# Patient Record
Sex: FEMALE | Race: WHITE | NOT HISPANIC OR LATINO | Employment: STUDENT | URBAN - METROPOLITAN AREA
[De-identification: names, ages, dates, MRNs, and addresses within clinical notes are randomized per-mention and may not be internally consistent; named-entity substitution may affect disease eponyms.]

---

## 2023-01-04 ENCOUNTER — TELEPHONE (OUTPATIENT)
Dept: NEPHROLOGY | Facility: CLINIC | Age: 23
End: 2023-01-04
Payer: COMMERCIAL

## 2023-01-04 NOTE — TELEPHONE ENCOUNTER
----- Message from Asha William sent at 1/4/2023  3:16 PM CST -----  Contact: Self  Type:  Test Results    Who Called: Pt  Name of Test (Lab/Mammo/Etc): Labs    Would the patient rather a call back or a response via [facility]? Call  Best Call Back Number: [phone]    Additional Information:  NEW pt w/Rt Kidney CKD Secondary to UPJ Obstruct, Ref by Dr. Estefany Negro, [facility], Ped Nephrology      Pt has an appt 1/5/2023, 8:30 am. but is willing to do labs after appt.

## 2023-01-05 ENCOUNTER — TELEPHONE (OUTPATIENT)
Dept: NEPHROLOGY | Facility: CLINIC | Age: 23
End: 2023-01-05
Payer: COMMERCIAL

## 2023-01-05 ENCOUNTER — PATIENT MESSAGE (OUTPATIENT)
Dept: NEPHROLOGY | Facility: CLINIC | Age: 23
End: 2023-01-05

## 2023-01-05 ENCOUNTER — TELEPHONE (OUTPATIENT)
Dept: OBSTETRICS AND GYNECOLOGY | Facility: CLINIC | Age: 23
End: 2023-01-05
Payer: COMMERCIAL

## 2023-01-05 ENCOUNTER — OFFICE VISIT (OUTPATIENT)
Dept: NEPHROLOGY | Facility: CLINIC | Age: 23
End: 2023-01-05
Payer: COMMERCIAL

## 2023-01-05 ENCOUNTER — HOSPITAL ENCOUNTER (OUTPATIENT)
Dept: RADIOLOGY | Facility: HOSPITAL | Age: 23
Discharge: HOME | End: 2023-01-05
Attending: INTERNAL MEDICINE
Payer: COMMERCIAL

## 2023-01-05 VITALS
HEART RATE: 97 BPM | WEIGHT: 109.88 LBS | OXYGEN SATURATION: 99 % | SYSTOLIC BLOOD PRESSURE: 112 MMHG | HEIGHT: 65 IN | BODY MASS INDEX: 18.31 KG/M2 | DIASTOLIC BLOOD PRESSURE: 70 MMHG

## 2023-01-05 DIAGNOSIS — Q62.39 UPJ OBSTRUCTION, CONGENITAL: ICD-10-CM

## 2023-01-05 DIAGNOSIS — I15.1 HYPERTENSION SECONDARY TO OTHER RENAL DISORDERS: ICD-10-CM

## 2023-01-05 DIAGNOSIS — R10.9 RIGHT FLANK PAIN: ICD-10-CM

## 2023-01-05 DIAGNOSIS — I77.810 DILATED AORTIC ROOT: ICD-10-CM

## 2023-01-05 DIAGNOSIS — R10.9 RIGHT FLANK PAIN: Primary | ICD-10-CM

## 2023-01-05 DIAGNOSIS — R73.9 HYPERGLYCEMIA: ICD-10-CM

## 2023-01-05 DIAGNOSIS — L70.0 ACNE VULGARIS: ICD-10-CM

## 2023-01-05 DIAGNOSIS — E55.9 VITAMIN D DEFICIENCY: ICD-10-CM

## 2023-01-05 PROCEDURE — 99999 PR PBB SHADOW E&M-EST. PATIENT-LVL III: ICD-10-PCS | Mod: PBBFAC,,, | Performed by: INTERNAL MEDICINE

## 2023-01-05 PROCEDURE — 99999 PR PBB SHADOW E&M-EST. PATIENT-LVL III: CPT | Mod: PBBFAC,,, | Performed by: INTERNAL MEDICINE

## 2023-01-05 PROCEDURE — 76770 US EXAM ABDO BACK WALL COMP: CPT | Mod: TC,PO

## 2023-01-05 PROCEDURE — 76770 US RETROPERITONEAL COMPLETE: ICD-10-PCS | Mod: 26,,, | Performed by: RADIOLOGY

## 2023-01-05 PROCEDURE — 99204 OFFICE O/P NEW MOD 45 MIN: CPT | Mod: S$GLB,,, | Performed by: INTERNAL MEDICINE

## 2023-01-05 PROCEDURE — 76770 US EXAM ABDO BACK WALL COMP: CPT | Mod: 26,,, | Performed by: RADIOLOGY

## 2023-01-05 PROCEDURE — 99204 PR OFFICE/OUTPT VISIT, NEW, LEVL IV, 45-59 MIN: ICD-10-PCS | Mod: S$GLB,,, | Performed by: INTERNAL MEDICINE

## 2023-01-05 RX ORDER — LOSARTAN POTASSIUM 25 MG/1
1 TABLET ORAL NIGHTLY
COMMUNITY
Start: 2021-08-01 | End: 2023-11-29 | Stop reason: SDUPTHER

## 2023-01-05 RX ORDER — TRETINOIN 0.5 MG/G
1 LOTION TOPICAL
COMMUNITY
Start: 2022-01-04 | End: 2024-04-01

## 2023-01-05 NOTE — TELEPHONE ENCOUNTER
----- Message from Jeana Chan, Patient Care Assistant sent at 1/5/2023 12:51 PM CST -----  Regarding: appointment  Contact: Pt  Type:  Sooner Appointment Request    Caller is requesting a sooner appointment.  Caller declined first available appointment listed below.  Caller will not accept being placed on the waitlist and is requesting a message be sent to doctor.    Name of Caller:  pt   When is the first available appointment?  2023  Symptoms:  new pt problem/concern - orders for US  Best Call Back Number:  [phone] (home)     Additional Information:  please call pt pt

## 2023-01-05 NOTE — PROGRESS NOTES
Subjective:       Patient ID: Marianne Gasca is a 22 y.o. White female who presents for new evaluation of Consult    HPI    She is referred by her Care Team at [facility] for history of small unilateral kidney due to UPJ obstruction s/p repair 2016. Last seen by Pediatric Nephrology 9/2022  She presents sooner than her one year follow up due to R flank pain, same pain she had 3 years prior, constant ache, dull not sharp, worst yesterday, non radiating, no LUTS. Uses prescription Toradol after Tylenol   BP has been intermittently elevated, was controlled on low sodium diet and basically DASH diet but required lisinopril, transitioned to ARB,  on low dose losartan now   Healthy diet where BP was controlled prior, 1557-6286  Since childhood good weight   Exercise regimen--light strengthening, pilates, walk and ride bikes   No DM in family   Mom HTN  High Lipids   No LE edema   No Caffeine, in general  Liquid sugar with R kidney 'sensation' thus avoids sugar   Heat intolereance   No chronic GI issues  In grad school, Masters    Review of Systems   Constitutional:  Negative for activity change, appetite change and fatigue.   HENT:  Negative for facial swelling.    Respiratory:  Negative for shortness of breath.    Cardiovascular:  Negative for leg swelling.   Gastrointestinal:  Negative for constipation, diarrhea, nausea and vomiting.   Genitourinary:  Positive for flank pain. Negative for decreased urine volume, difficulty urinating, hematuria and urgency.   Musculoskeletal:  Positive for back pain.   Skin:  Negative for rash.   Neurological:  Negative for weakness.   Psychiatric/Behavioral:  Negative for confusion and decreased concentration.      Objective:      Physical Exam  Vitals and nursing note reviewed.   Constitutional:       General: She is not in acute distress.     Appearance: Normal appearance. She is normal weight. She is not ill-appearing.   HENT:      Head: Atraumatic.   Eyes:      General: No  scleral icterus.  Cardiovascular:      Rate and Rhythm: Normal rate.   Pulmonary:      Effort: Pulmonary effort is normal. No respiratory distress.      Breath sounds: No wheezing or rales.   Abdominal:      General: There is no distension.   Musculoskeletal:         General: No swelling.      Right lower leg: No edema.      Left lower leg: No edema.   Skin:     General: Skin is warm and dry.   Neurological:      Mental Status: She is alert and oriented to person, place, and time.   Psychiatric:         Mood and Affect: Mood normal.       Assessment:       1. Right flank pain    2. UPJ obstruction, congenital    3. Vitamin D deficiency    4. Hyperglycemia    5. Hypertension secondary to other renal disorders    6. Dilated ascending aorta    7. Acne vulgaris        Plan:          R flank pain in setting of congenital UPJ obstruction   - check urinalysis, chemistries, and renal u/s  - would like TRS and will schedule ASAP   - will need to establish with Urology     CKD Stage 1 with unilateral small R kidney   - annual chemistries and urine protein evaluation to be reviewed by Nephrologist   - baseline renal u/s    HTN  - monitor on current dose losartan     Mineral and Bone Disease  - check D level     Pain control  - stop Toradol, Tylenol followed by ASA , max dose 625mg for several days     Kidney Health  - screen for pre DM and DM  - already on low sodium diet and healthy diet   - avoid NSAIDs      Needs Adult PCP

## 2023-01-09 ENCOUNTER — HOSPITAL ENCOUNTER (OUTPATIENT)
Dept: RADIOLOGY | Facility: HOSPITAL | Age: 23
Discharge: HOME | End: 2023-01-09
Attending: OBSTETRICS & GYNECOLOGY
Payer: COMMERCIAL

## 2023-01-09 ENCOUNTER — PATIENT MESSAGE (OUTPATIENT)
Dept: NEPHROLOGY | Facility: CLINIC | Age: 23
End: 2023-01-09
Payer: COMMERCIAL

## 2023-01-09 ENCOUNTER — OFFICE VISIT (OUTPATIENT)
Dept: OBSTETRICS AND GYNECOLOGY | Facility: CLINIC | Age: 23
End: 2023-01-09
Payer: COMMERCIAL

## 2023-01-09 VITALS
DIASTOLIC BLOOD PRESSURE: 62 MMHG | WEIGHT: 109.13 LBS | SYSTOLIC BLOOD PRESSURE: 104 MMHG | HEIGHT: 65 IN | BODY MASS INDEX: 18.18 KG/M2

## 2023-01-09 DIAGNOSIS — I15.1 HYPERTENSION SECONDARY TO OTHER RENAL DISORDERS: ICD-10-CM

## 2023-01-09 DIAGNOSIS — N83.202 LEFT OVARIAN CYST: Primary | ICD-10-CM

## 2023-01-09 DIAGNOSIS — N13.5 UPJ OBSTRUCTION, ACQUIRED: ICD-10-CM

## 2023-01-09 DIAGNOSIS — N83.202 LEFT OVARIAN CYST: ICD-10-CM

## 2023-01-09 DIAGNOSIS — N18.9 CHRONIC KIDNEY DISEASE, UNSPECIFIED CKD STAGE: ICD-10-CM

## 2023-01-09 DIAGNOSIS — R10.9 RIGHT FLANK PAIN: Primary | ICD-10-CM

## 2023-01-09 PROCEDURE — 3074F SYST BP LT 130 MM HG: CPT | Mod: CPTII,S$GLB,, | Performed by: OBSTETRICS & GYNECOLOGY

## 2023-01-09 PROCEDURE — 4010F ACE/ARB THERAPY RXD/TAKEN: CPT | Mod: CPTII,S$GLB,, | Performed by: OBSTETRICS & GYNECOLOGY

## 2023-01-09 PROCEDURE — 3078F PR MOST RECENT DIASTOLIC BLOOD PRESSURE < 80 MM HG: ICD-10-PCS | Mod: CPTII,S$GLB,, | Performed by: OBSTETRICS & GYNECOLOGY

## 2023-01-09 PROCEDURE — 1159F PR MEDICATION LIST DOCUMENTED IN MEDICAL RECORD: ICD-10-PCS | Mod: CPTII,S$GLB,, | Performed by: OBSTETRICS & GYNECOLOGY

## 2023-01-09 PROCEDURE — 3008F BODY MASS INDEX DOCD: CPT | Mod: CPTII,S$GLB,, | Performed by: OBSTETRICS & GYNECOLOGY

## 2023-01-09 PROCEDURE — 3044F HG A1C LEVEL LT 7.0%: CPT | Mod: CPTII,S$GLB,, | Performed by: OBSTETRICS & GYNECOLOGY

## 2023-01-09 PROCEDURE — 4010F PR ACE/ARB THEARPY RXD/TAKEN: ICD-10-PCS | Mod: CPTII,S$GLB,, | Performed by: OBSTETRICS & GYNECOLOGY

## 2023-01-09 PROCEDURE — 3044F PR MOST RECENT HEMOGLOBIN A1C LEVEL <7.0%: ICD-10-PCS | Mod: CPTII,S$GLB,, | Performed by: OBSTETRICS & GYNECOLOGY

## 2023-01-09 PROCEDURE — 1160F PR REVIEW ALL MEDS BY PRESCRIBER/CLIN PHARMACIST DOCUMENTED: ICD-10-PCS | Mod: CPTII,S$GLB,, | Performed by: OBSTETRICS & GYNECOLOGY

## 2023-01-09 PROCEDURE — 1160F RVW MEDS BY RX/DR IN RCRD: CPT | Mod: CPTII,S$GLB,, | Performed by: OBSTETRICS & GYNECOLOGY

## 2023-01-09 PROCEDURE — 99203 OFFICE O/P NEW LOW 30 MIN: CPT | Mod: S$GLB,,, | Performed by: OBSTETRICS & GYNECOLOGY

## 2023-01-09 PROCEDURE — 99999 PR PBB SHADOW E&M-EST. PATIENT-LVL III: CPT | Mod: PBBFAC,,, | Performed by: OBSTETRICS & GYNECOLOGY

## 2023-01-09 PROCEDURE — 76830 TRANSVAGINAL US NON-OB: CPT | Mod: 26,,, | Performed by: RADIOLOGY

## 2023-01-09 PROCEDURE — 76830 US PELVIS COMP WITH TRANSVAG NON-OB (XPD): ICD-10-PCS | Mod: 26,,, | Performed by: RADIOLOGY

## 2023-01-09 PROCEDURE — 1159F MED LIST DOCD IN RCRD: CPT | Mod: CPTII,S$GLB,, | Performed by: OBSTETRICS & GYNECOLOGY

## 2023-01-09 PROCEDURE — 99203 PR OFFICE/OUTPT VISIT, NEW, LEVL III, 30-44 MIN: ICD-10-PCS | Mod: S$GLB,,, | Performed by: OBSTETRICS & GYNECOLOGY

## 2023-01-09 PROCEDURE — 3066F PR DOCUMENTATION OF TREATMENT FOR NEPHROPATHY: ICD-10-PCS | Mod: CPTII,S$GLB,, | Performed by: OBSTETRICS & GYNECOLOGY

## 2023-01-09 PROCEDURE — 99999 PR PBB SHADOW E&M-EST. PATIENT-LVL III: ICD-10-PCS | Mod: PBBFAC,,, | Performed by: OBSTETRICS & GYNECOLOGY

## 2023-01-09 PROCEDURE — 3066F NEPHROPATHY DOC TX: CPT | Mod: CPTII,S$GLB,, | Performed by: OBSTETRICS & GYNECOLOGY

## 2023-01-09 PROCEDURE — 76856 US EXAM PELVIC COMPLETE: CPT | Mod: TC,PO

## 2023-01-09 PROCEDURE — 3008F PR BODY MASS INDEX (BMI) DOCUMENTED: ICD-10-PCS | Mod: CPTII,S$GLB,, | Performed by: OBSTETRICS & GYNECOLOGY

## 2023-01-09 PROCEDURE — 3078F DIAST BP <80 MM HG: CPT | Mod: CPTII,S$GLB,, | Performed by: OBSTETRICS & GYNECOLOGY

## 2023-01-09 PROCEDURE — 76856 US EXAM PELVIC COMPLETE: CPT | Mod: 26,,, | Performed by: RADIOLOGY

## 2023-01-09 PROCEDURE — 76856 US PELVIS COMP WITH TRANSVAG NON-OB (XPD): ICD-10-PCS | Mod: 26,,, | Performed by: RADIOLOGY

## 2023-01-09 PROCEDURE — 3074F PR MOST RECENT SYSTOLIC BLOOD PRESSURE < 130 MM HG: ICD-10-PCS | Mod: CPTII,S$GLB,, | Performed by: OBSTETRICS & GYNECOLOGY

## 2023-01-09 NOTE — PROGRESS NOTES
"Chief Complaint   Patient presents with    Establish Care    pain during periods    Ovarian Cyst     Left side from us        History of Present Illness: Marianne Gasca is a 22 y.o. female that presents today 2023 for   Chief Complaint   Patient presents with    Establish Care    pain during periods    Ovarian Cyst     Left side from us      She reports pain with her menses.  Menarche age 12.  She reports more cramping with menses age 15.  She reports leg pain and worsening.     She reports 6 days of bleeding with heavy days tampon changes every 4 hour.   She reports passing clotting.  She denies any bleeding bleeding between there periods.     Past Medical History:   Diagnosis Date    Chronic kidney disease, unspecified     HTN (hypertension)     UPJ (ureteropelvic junction) obstruction     RIGHT       Past Surgical History:   Procedure Laterality Date    MOUTH SURGERY      REMOVAL OF URETERAL STENT  10/2016    URETERAL STENT PLACEMENT  2016    ROBOT    wisdom teeth         Current Outpatient Medications   Medication Sig Dispense Refill    losartan (COZAAR) 25 MG tablet Take 1 tablet by mouth every evening.      tretinoin (ALTRENO) 0.05 % Lotn Apply 1 application topically.      drospirenone, contraceptive, 4 mg (28) Tab Take 1 tablet (4 mg total) by mouth once daily. 28 tablet 3     No current facility-administered medications for this visit.       Review of patient's allergies indicates:  No Known Allergies    Family History   Problem Relation Age of Onset    Breast cancer Maternal Grandmother     Ovarian cancer Neg Hx        Social History     Tobacco Use    Smoking status: Never    Smokeless tobacco: Never   Substance Use Topics    Alcohol use: Yes     Comment: once a month    Drug use: Never       OB History    Para Term  AB Living   0 0 0 0 0 0   SAB IAB Ectopic Multiple Live Births   0 0 0 0 0         /62   Ht 5' 4.5" (1.638 m)   Wt 49.5 kg (109 lb 2 oz)   LMP 2022 (Exact " Date)   Physical Exam:  APPEARANCE: Well nourished, well developed, in no acute distress.  SKIN: Normal skin turgor, no lesions.  NECK: Neck symmetric without masses   RESPIRATORY: Normal respiratory effort with no retractions or use of accessory muscles  CARDIOVASCULAR: Peripheral vascular system with no swelling no varicosities and palpation of pulses normal  LYMPHATIC: No enlargements of the lymph nodes noted in the neck, axillae, or groin  ABDOMEN: Soft. No tenderness or masses. No hepatosplenomegaly. No hernias.  PELVIC: Normal external female genitalia without lesions. Normal hair distribution. Adequate perineal body, normal urethral meatus. Urethra with no masses.  Bladder nontender. Vagina moist and well rugated without lesions or discharge.  Bimanual exam showed uterus normal size, shape, position, mobile and nontender. Adnexa with large LEFT MASS non-tender. Urethra and bladder normal.  EXTREMITIES: No clubbing cyanosis or edema.    ASSESSMENT/PLAN:  Left ovarian cyst  -     US Pelvis Comp with Transvag NON-OB (xpd); Future; Expected date: 01/09/2023  -     drospirenone, contraceptive, 4 mg (28) Tab; Take 1 tablet (4 mg total) by mouth once daily.  Dispense: 28 tablet; Refill: 3    UPJ obstruction, congential     Chronic kidney disease, unspecified CKD stage    Hypertension secondary to other renal disorders    Other orders  -     Discontinue: drospirenone, contraceptive, 4 mg (28) Tab; Take 1 tablet (4 mg total) by mouth once daily.  Dispense: 90 tablet; Refill: 3      We discussed risk of conservative management , surgery, and possible torsion.      We discussed her healthy left kidney and left ovarian cyst risks of obstruction.     Will await pelvic view to make recommendations.       30 minutes spent today spent preparing reviewing previous external notes, reviewing previous results, performing medical examination, ordering tests or medications, counseling and documenting.

## 2023-01-10 ENCOUNTER — LAB VISIT (OUTPATIENT)
Dept: LAB | Facility: HOSPITAL | Age: 23
End: 2023-01-10
Attending: OBSTETRICS & GYNECOLOGY
Payer: COMMERCIAL

## 2023-01-10 DIAGNOSIS — N83.8 OVARIAN MASS, LEFT: ICD-10-CM

## 2023-01-10 LAB
AFP SERPL-MCNC: 2.2 NG/ML (ref 0–8.4)
CANCER AG125 SERPL-ACNC: 11 U/ML (ref 0–30)
CEA SERPL-MCNC: <1.7 NG/ML (ref 0–5)
ESTRADIOL SERPL-MCNC: 88 PG/ML
HCG INTACT+B SERPL-ACNC: <2.4 MIU/ML
LDH SERPL L TO P-CCNC: 135 U/L (ref 110–260)

## 2023-01-10 PROCEDURE — 83615 LACTATE (LD) (LDH) ENZYME: CPT | Performed by: OBSTETRICS & GYNECOLOGY

## 2023-01-10 PROCEDURE — 86336 INHIBIN A: CPT | Performed by: OBSTETRICS & GYNECOLOGY

## 2023-01-10 PROCEDURE — 84402 ASSAY OF FREE TESTOSTERONE: CPT | Performed by: OBSTETRICS & GYNECOLOGY

## 2023-01-10 PROCEDURE — 86304 IMMUNOASSAY TUMOR CA 125: CPT | Performed by: OBSTETRICS & GYNECOLOGY

## 2023-01-10 PROCEDURE — 36415 COLL VENOUS BLD VENIPUNCTURE: CPT | Mod: PN | Performed by: OBSTETRICS & GYNECOLOGY

## 2023-01-10 PROCEDURE — 82670 ASSAY OF TOTAL ESTRADIOL: CPT | Performed by: OBSTETRICS & GYNECOLOGY

## 2023-01-10 PROCEDURE — 82105 ALPHA-FETOPROTEIN SERUM: CPT | Performed by: OBSTETRICS & GYNECOLOGY

## 2023-01-10 PROCEDURE — 84702 CHORIONIC GONADOTROPIN TEST: CPT | Performed by: OBSTETRICS & GYNECOLOGY

## 2023-01-10 PROCEDURE — 82378 CARCINOEMBRYONIC ANTIGEN: CPT | Performed by: OBSTETRICS & GYNECOLOGY

## 2023-01-11 LAB — INHIBIN A SERPL-MCNC: 20 PG/ML

## 2023-01-13 LAB — TESTOST FREE SERPL-MCNC: <0.4 PG/ML

## 2023-01-15 PROBLEM — E55.9 VITAMIN D DEFICIENCY: Status: ACTIVE | Noted: 2023-01-15

## 2023-01-15 PROBLEM — R10.9 RIGHT FLANK PAIN: Status: ACTIVE | Noted: 2023-01-15

## 2023-01-15 PROBLEM — E55.9 VITAMIN D DEFICIENCY: Status: RESOLVED | Noted: 2023-01-15 | Resolved: 2023-01-15

## 2023-01-15 PROBLEM — Q62.39 UPJ OBSTRUCTION, CONGENITAL: Status: ACTIVE | Noted: 2023-01-15

## 2023-01-15 PROBLEM — I15.1 HYPERTENSION SECONDARY TO OTHER RENAL DISORDERS: Status: ACTIVE | Noted: 2023-01-15

## 2023-01-15 PROBLEM — L70.0 ACNE VULGARIS: Status: ACTIVE | Noted: 2023-01-15

## 2023-01-15 PROBLEM — I77.810 DILATED AORTIC ROOT: Status: ACTIVE | Noted: 2023-01-15

## 2023-01-17 ENCOUNTER — TELEPHONE (OUTPATIENT)
Dept: OBSTETRICS AND GYNECOLOGY | Facility: CLINIC | Age: 23
End: 2023-01-17
Payer: COMMERCIAL

## 2023-01-17 ENCOUNTER — PATIENT MESSAGE (OUTPATIENT)
Dept: OBSTETRICS AND GYNECOLOGY | Facility: CLINIC | Age: 23
End: 2023-01-17
Payer: COMMERCIAL

## 2023-01-17 DIAGNOSIS — N83.8 OVARIAN MASS, LEFT: Primary | ICD-10-CM

## 2023-01-17 DIAGNOSIS — N83.202 LEFT OVARIAN CYST: ICD-10-CM

## 2023-01-17 NOTE — TELEPHONE ENCOUNTER
----- Message from Merle Iverson sent at 1/17/2023 11:26 AM CST -----  Regarding: results  Contact: patient  Type: Needs Medical Advice  Who Called:  patient  Symptoms (please be specific):  result  How long has patient had these symptoms:    Pharmacy name and phone #:    Best Call Back Number: [phone] (home)     Additional Information: Please call patient to advise. Thanks!

## 2023-01-17 NOTE — TELEPHONE ENCOUNTER
----- Message from Ana James sent at 1/17/2023  1:38 PM CST -----  Contact: [phone]  Type:  Patient Returning Call    Who Called:  Pt   Who Left Message for Patient:  na   Does the patient know what this is regarding?:  Yes Test results Labs on [address]  Best Call Back Number:  [phone]    Additional Information:  Pls call back and advise       Pt is also asking if you can send her drospirenone, contraceptive, 4 mg (28) Tab to     [facility] ([facility] ) - [place], [place] - [address]  [address]  [address]  [facility] [address]  Phone: [phone] Fax: [phone]

## 2023-01-19 DIAGNOSIS — R10.9 RIGHT FLANK PAIN: Primary | ICD-10-CM

## 2023-01-20 ENCOUNTER — HOSPITAL ENCOUNTER (OUTPATIENT)
Dept: RADIOLOGY | Facility: HOSPITAL | Age: 23
Discharge: HOME | End: 2023-01-20
Attending: INTERNAL MEDICINE
Payer: COMMERCIAL

## 2023-01-20 DIAGNOSIS — R10.9 RIGHT FLANK PAIN: ICD-10-CM

## 2023-01-20 PROCEDURE — 78708 K FLOW/FUNCT IMAGE W/DRUG: CPT | Mod: 26,,, | Performed by: RADIOLOGY

## 2023-01-20 PROCEDURE — A9562 TC99M MERTIATIDE: HCPCS | Mod: PO

## 2023-01-20 PROCEDURE — 78708 NM KIDNEY W FLOW AND FUNCTION W PHARMACOLOGICAL: ICD-10-PCS | Mod: 26,,, | Performed by: RADIOLOGY

## 2023-01-23 ENCOUNTER — TELEPHONE (OUTPATIENT)
Dept: OBSTETRICS AND GYNECOLOGY | Facility: CLINIC | Age: 23
End: 2023-01-23
Payer: COMMERCIAL

## 2023-01-23 ENCOUNTER — PATIENT MESSAGE (OUTPATIENT)
Dept: NEPHROLOGY | Facility: CLINIC | Age: 23
End: 2023-01-23
Payer: COMMERCIAL

## 2023-01-23 NOTE — TELEPHONE ENCOUNTER
----- Message from Neema Bueno sent at 1/23/2023  4:07 PM CST -----  Contact: self  Type:  RX Refill Request    Who Called: self  Refill or New Rx:   RX Name and Strength: drospirenone, contraceptive, 4 mg (28) Tab    How is the patient currently taking it? (ex. 1XDay): as directed  Is this a 30 day or 90 day RX:30  Preferred Pharmacy with phone number:    [facility] ([facility] ) - [place], [place] - [address]  [address]  [address]  [facility] [address]  Phone: [phone] Fax: [phone]      Local or Mail Order:local  Ordering Provider:dr Ortiz  Would the patient rather a call back or a response via [facility]? call  Best Call Back Number:[phone] (home)     Additional Information: pt does not alternative please send another one. Insurance will not cover it. Pt needs a similar rx for the Rosalinda. Please advise and thank you.

## 2023-01-24 ENCOUNTER — LAB VISIT (OUTPATIENT)
Dept: LAB | Facility: HOSPITAL | Age: 23
End: 2023-01-24
Attending: INTERNAL MEDICINE
Payer: COMMERCIAL

## 2023-01-24 ENCOUNTER — PATIENT MESSAGE (OUTPATIENT)
Dept: NEPHROLOGY | Facility: CLINIC | Age: 23
End: 2023-01-24
Payer: COMMERCIAL

## 2023-01-24 DIAGNOSIS — R10.9 RIGHT FLANK PAIN: ICD-10-CM

## 2023-01-24 LAB
ALBUMIN SERPL BCP-MCNC: 3.9 G/DL (ref 3.5–5.2)
ANION GAP SERPL CALC-SCNC: 9 MMOL/L (ref 8–16)
BUN SERPL-MCNC: 15 MG/DL (ref 6–20)
CALCIUM SERPL-MCNC: 9.4 MG/DL (ref 8.7–10.5)
CHLORIDE SERPL-SCNC: 105 MMOL/L (ref 95–110)
CO2 SERPL-SCNC: 25 MMOL/L (ref 23–29)
CREAT SERPL-MCNC: 0.8 MG/DL (ref 0.5–1.4)
EST. GFR  (NO RACE VARIABLE): >60 ML/MIN/1.73 M^2
GLUCOSE SERPL-MCNC: 111 MG/DL (ref 70–110)
PHOSPHATE SERPL-MCNC: 3.4 MG/DL (ref 2.7–4.5)
POTASSIUM SERPL-SCNC: 4.8 MMOL/L (ref 3.5–5.1)
SODIUM SERPL-SCNC: 139 MMOL/L (ref 136–145)

## 2023-01-24 PROCEDURE — 36415 COLL VENOUS BLD VENIPUNCTURE: CPT | Mod: PN | Performed by: INTERNAL MEDICINE

## 2023-01-24 PROCEDURE — 80069 RENAL FUNCTION PANEL: CPT | Performed by: INTERNAL MEDICINE

## 2023-01-24 RX ORDER — ACETAMINOPHEN AND CODEINE PHOSPHATE 120; 12 MG/5ML; MG/5ML
1 SOLUTION ORAL DAILY
Qty: 90 TABLET | Refills: 3 | Status: SHIPPED | OUTPATIENT
Start: 2023-01-24 | End: 2023-01-29 | Stop reason: SDUPTHER

## 2023-02-28 ENCOUNTER — TELEPHONE (OUTPATIENT)
Dept: OBSTETRICS AND GYNECOLOGY | Facility: CLINIC | Age: 23
End: 2023-02-28
Payer: COMMERCIAL

## 2023-02-28 ENCOUNTER — HOSPITAL ENCOUNTER (OUTPATIENT)
Dept: RADIOLOGY | Facility: HOSPITAL | Age: 23
Discharge: HOME | End: 2023-02-28
Attending: OBSTETRICS & GYNECOLOGY
Payer: COMMERCIAL

## 2023-02-28 DIAGNOSIS — N83.8 OVARIAN MASS, LEFT: ICD-10-CM

## 2023-02-28 PROCEDURE — 76830 TRANSVAGINAL US NON-OB: CPT | Mod: 26,,, | Performed by: RADIOLOGY

## 2023-02-28 PROCEDURE — 76856 US PELVIS COMP WITH TRANSVAG NON-OB (XPD): ICD-10-PCS | Mod: 26,,, | Performed by: RADIOLOGY

## 2023-02-28 PROCEDURE — 76856 US EXAM PELVIC COMPLETE: CPT | Mod: 26,,, | Performed by: RADIOLOGY

## 2023-02-28 PROCEDURE — 76830 US PELVIS COMP WITH TRANSVAG NON-OB (XPD): ICD-10-PCS | Mod: 26,,, | Performed by: RADIOLOGY

## 2023-02-28 PROCEDURE — 76856 US EXAM PELVIC COMPLETE: CPT | Mod: TC,PO

## 2023-03-04 ENCOUNTER — PATIENT MESSAGE (OUTPATIENT)
Dept: OBSTETRICS AND GYNECOLOGY | Facility: CLINIC | Age: 23
End: 2023-03-04
Payer: COMMERCIAL

## 2023-05-05 ENCOUNTER — PATIENT MESSAGE (OUTPATIENT)
Dept: OBSTETRICS AND GYNECOLOGY | Facility: CLINIC | Age: 23
End: 2023-05-05
Payer: COMMERCIAL

## 2023-11-29 ENCOUNTER — TELEPHONE (OUTPATIENT)
Dept: NEPHROLOGY | Facility: CLINIC | Age: 23
End: 2023-11-29
Payer: COMMERCIAL

## 2023-11-29 DIAGNOSIS — Q62.39 UPJ OBSTRUCTION, CONGENITAL: Primary | ICD-10-CM

## 2023-11-29 DIAGNOSIS — E55.9 VITAMIN D DEFICIENCY: ICD-10-CM

## 2023-11-29 NOTE — TELEPHONE ENCOUNTER
----- Message from Kenneth Silverio sent at 11/29/2023 10:04 AM CST -----  Contact: Marianne Wilson is calling in regards to requesting refills on the medication losartan (COZAAR) 25 MG tablet.  Please call back at  [phone]    [facility] - [place] [id number]  [address] [address]  Phone: [phone] Fax: [phone]  Hours: Not open 24 hours    Thanks

## 2023-11-29 NOTE — TELEPHONE ENCOUNTER
Patient called to ask for a refill on Losartan 25 mg to [facility]. She is scheduled for labs and office visit in January 2024.  Also, does patient need another nuc med renal scan for January?

## 2023-12-04 ENCOUNTER — PATIENT MESSAGE (OUTPATIENT)
Dept: NEPHROLOGY | Facility: CLINIC | Age: 23
End: 2023-12-04
Payer: COMMERCIAL

## 2023-12-04 RX ORDER — LOSARTAN POTASSIUM 25 MG/1
25 TABLET ORAL NIGHTLY
Qty: 90 TABLET | Refills: 3 | Status: ON HOLD | OUTPATIENT
Start: 2023-12-04 | End: 2024-01-22 | Stop reason: HOSPADM

## 2023-12-04 RX ORDER — LOSARTAN POTASSIUM 25 MG/1
25 TABLET ORAL NIGHTLY
Qty: 90 TABLET | Refills: 3 | Status: SHIPPED | OUTPATIENT
Start: 2023-12-04 | End: 2024-03-13 | Stop reason: SDUPTHER

## 2023-12-19 ENCOUNTER — LAB VISIT (OUTPATIENT)
Dept: LAB | Facility: HOSPITAL | Age: 23
End: 2023-12-19
Attending: INTERNAL MEDICINE
Payer: COMMERCIAL

## 2023-12-19 DIAGNOSIS — R10.9 RIGHT FLANK PAIN: ICD-10-CM

## 2023-12-19 DIAGNOSIS — E55.9 VITAMIN D DEFICIENCY: ICD-10-CM

## 2023-12-19 DIAGNOSIS — Q62.39 UPJ OBSTRUCTION, CONGENITAL: ICD-10-CM

## 2023-12-19 LAB
25(OH)D3+25(OH)D2 SERPL-MCNC: 46 NG/ML (ref 30–96)
ALBUMIN SERPL BCP-MCNC: 4.1 G/DL (ref 3.5–5.2)
ANION GAP SERPL CALC-SCNC: 7 MMOL/L (ref 8–16)
BASOPHILS # BLD AUTO: 0.03 K/UL (ref 0–0.2)
BASOPHILS NFR BLD: 0.4 % (ref 0–1.9)
BUN SERPL-MCNC: 12 MG/DL (ref 6–20)
CALCIUM SERPL-MCNC: 9.4 MG/DL (ref 8.7–10.5)
CHLORIDE SERPL-SCNC: 105 MMOL/L (ref 95–110)
CO2 SERPL-SCNC: 29 MMOL/L (ref 23–29)
CREAT SERPL-MCNC: 0.9 MG/DL (ref 0.5–1.4)
DIFFERENTIAL METHOD: NORMAL
EOSINOPHIL # BLD AUTO: 0.1 K/UL (ref 0–0.5)
EOSINOPHIL NFR BLD: 1.5 % (ref 0–8)
ERYTHROCYTE [DISTWIDTH] IN BLOOD BY AUTOMATED COUNT: 12.3 % (ref 11.5–14.5)
EST. GFR  (NO RACE VARIABLE): >60 ML/MIN/1.73 M^2
GLUCOSE SERPL-MCNC: 73 MG/DL (ref 70–110)
HCT VFR BLD AUTO: 40 % (ref 37–48.5)
HGB BLD-MCNC: 13.5 G/DL (ref 12–16)
IMM GRANULOCYTES # BLD AUTO: 0.01 K/UL (ref 0–0.04)
IMM GRANULOCYTES NFR BLD AUTO: 0.1 % (ref 0–0.5)
LYMPHOCYTES # BLD AUTO: 2.7 K/UL (ref 1–4.8)
LYMPHOCYTES NFR BLD: 35.1 % (ref 18–48)
MCH RBC QN AUTO: 29.9 PG (ref 27–31)
MCHC RBC AUTO-ENTMCNC: 33.8 G/DL (ref 32–36)
MCV RBC AUTO: 89 FL (ref 82–98)
MONOCYTES # BLD AUTO: 0.5 K/UL (ref 0.3–1)
MONOCYTES NFR BLD: 6 % (ref 4–15)
NEUTROPHILS # BLD AUTO: 4.4 K/UL (ref 1.8–7.7)
NEUTROPHILS NFR BLD: 56.9 % (ref 38–73)
NRBC BLD-RTO: 0 /100 WBC
PHOSPHATE SERPL-MCNC: 3.1 MG/DL (ref 2.7–4.5)
PLATELET # BLD AUTO: 215 K/UL (ref 150–450)
PMV BLD AUTO: 10.9 FL (ref 9.2–12.9)
POTASSIUM SERPL-SCNC: 4.5 MMOL/L (ref 3.5–5.1)
PTH-INTACT SERPL-MCNC: 47.8 PG/ML (ref 9–77)
RBC # BLD AUTO: 4.52 M/UL (ref 4–5.4)
SODIUM SERPL-SCNC: 141 MMOL/L (ref 136–145)
URATE SERPL-MCNC: 5.7 MG/DL (ref 2.4–5.7)
WBC # BLD AUTO: 7.81 K/UL (ref 3.9–12.7)

## 2023-12-19 PROCEDURE — 82306 VITAMIN D 25 HYDROXY: CPT | Performed by: INTERNAL MEDICINE

## 2023-12-19 PROCEDURE — 85025 COMPLETE CBC W/AUTO DIFF WBC: CPT | Performed by: INTERNAL MEDICINE

## 2023-12-19 PROCEDURE — 36415 COLL VENOUS BLD VENIPUNCTURE: CPT | Mod: PN | Performed by: INTERNAL MEDICINE

## 2023-12-19 PROCEDURE — 80069 RENAL FUNCTION PANEL: CPT | Performed by: INTERNAL MEDICINE

## 2023-12-19 PROCEDURE — 83970 ASSAY OF PARATHORMONE: CPT | Performed by: INTERNAL MEDICINE

## 2023-12-19 PROCEDURE — 84550 ASSAY OF BLOOD/URIC ACID: CPT | Performed by: INTERNAL MEDICINE

## 2024-01-02 ENCOUNTER — OFFICE VISIT (OUTPATIENT)
Dept: OBSTETRICS AND GYNECOLOGY | Facility: CLINIC | Age: 24
End: 2024-01-02
Payer: COMMERCIAL

## 2024-01-02 VITALS
WEIGHT: 113.13 LBS | DIASTOLIC BLOOD PRESSURE: 74 MMHG | BODY MASS INDEX: 19.11 KG/M2 | SYSTOLIC BLOOD PRESSURE: 124 MMHG

## 2024-01-02 DIAGNOSIS — N13.5 UPJ OBSTRUCTION, ACQUIRED: ICD-10-CM

## 2024-01-02 DIAGNOSIS — Z01.419 ROUTINE GYNECOLOGICAL EXAMINATION: Primary | ICD-10-CM

## 2024-01-02 DIAGNOSIS — N83.202 LEFT OVARIAN CYST: ICD-10-CM

## 2024-01-02 PROCEDURE — 1159F MED LIST DOCD IN RCRD: CPT | Mod: CPTII,S$GLB,, | Performed by: OBSTETRICS & GYNECOLOGY

## 2024-01-02 PROCEDURE — 3078F DIAST BP <80 MM HG: CPT | Mod: CPTII,S$GLB,, | Performed by: OBSTETRICS & GYNECOLOGY

## 2024-01-02 PROCEDURE — 3074F SYST BP LT 130 MM HG: CPT | Mod: CPTII,S$GLB,, | Performed by: OBSTETRICS & GYNECOLOGY

## 2024-01-02 PROCEDURE — 99395 PREV VISIT EST AGE 18-39: CPT | Mod: S$GLB,,, | Performed by: OBSTETRICS & GYNECOLOGY

## 2024-01-02 PROCEDURE — 99999 PR PBB SHADOW E&M-EST. PATIENT-LVL III: CPT | Mod: PBBFAC,,, | Performed by: OBSTETRICS & GYNECOLOGY

## 2024-01-02 PROCEDURE — 3008F BODY MASS INDEX DOCD: CPT | Mod: CPTII,S$GLB,, | Performed by: OBSTETRICS & GYNECOLOGY

## 2024-01-02 NOTE — PROGRESS NOTES
Chief Complaint   Patient presents with    Annual Exam       History of Present Illness: Marianne Gasca is a 23 y.o. female that presents today 2024 with Patient's last menstrual period was 2023.  for well gyn visit.    Past Medical History:   Diagnosis Date    Chronic kidney disease, unspecified     HTN (hypertension)     UPJ (ureteropelvic junction) obstruction     RIGHT       Past Surgical History:   Procedure Laterality Date    MOUTH SURGERY      REMOVAL OF URETERAL STENT  10/2016    URETERAL STENT PLACEMENT  2016    ROBOT    wisdom teeth         Outpatient Medications Prior to Visit   Medication Sig Dispense Refill    losartan (COZAAR) 25 MG tablet Take 1 tablet (25 mg total) by mouth every evening. 90 tablet 3    losartan (COZAAR) 25 MG tablet Take 1 tablet (25 mg total) by mouth every evening. 90 tablet 3    tretinoin (ALTRENO) 0.05 % Lotn Apply 1 application topically.      norgestrel-ethinyl estradioL (LO/OVRAL) 0.3-30 mg-mcg per tablet Take 1 tablet by mouth once daily. 90 tablet 3     No facility-administered medications prior to visit.       Review of patient's allergies indicates:  No Known Allergies    Family History   Problem Relation Age of Onset    Colon cancer Paternal Grandmother     Breast cancer Maternal Grandmother     Ovarian cancer Neg Hx     Uterine cancer Neg Hx     Cervical cancer Neg Hx        Social History     Socioeconomic History    Marital status: Single   Tobacco Use    Smoking status: Never    Smokeless tobacco: Never   Substance and Sexual Activity    Alcohol use: Yes     Comment: once a month    Drug use: Never    Sexual activity: Never       OB History    Para Term  AB Living   0 0 0 0 0 0   SAB IAB Ectopic Multiple Live Births   0 0 0 0 0       Review of Symptoms:  GENERAL: Denies weight gain or weight loss. Feeling well overall.   SKIN: Denies rash or lesions.   HEAD: Denies head injury or headache.   NODES: Denies enlarged lymph nodes.   CHEST:  Denies chest pain or shortness of breath.   CARDIOVASCULAR: Denies palpitations or left sided chest pain.   ABDOMEN: No abdominal pain, constipation, diarrhea, nausea, vomiting or rectal bleeding.   URINARY: No frequency, dysuria, hematuria, or burning on urination.  HEMATOLOGIC: No easy bruisability or excessive bleeding.   MUSCULOSKELETAL: Denies joint pain or swelling.     /74   Wt 51.3 kg (113 lb 1.5 oz)   LMP 12/04/2023   Physical Exam:  APPEARANCE: Well nourished, well developed, in no acute distress.  SKIN: Normal skin turgor, no lesions.  NECK: Neck symmetric without masses   RESPIRATORY: Normal respiratory effort with no retractions or use of accessory muscles  CARDIOVASCULAR: Peripheral vascular system with no swelling no varicosities and palpation of pulses normal  LYMPHATIC: No enlargements of the lymph nodes noted in the neck, axillae, or groin  ABDOMEN: Soft. No tenderness or masses. No hepatosplenomegaly. No hernias.  BREASTS: Symmetrical, no skin changes or visible lesions. No palpable masses, nipple discharge or adenopathy bilaterally.  PELVIC: Normal external female genitalia without lesions. Normal hair distribution. Adequate perineal body, normal urethral meatus. Urethra with no masses.  Bladder nontender. Vagina bleeding too much today to do pap   No significant cystocele or rectocele. Bimanual exam showed uterus normal size, shape, position, mobile and nontender. Adnexa with  LARGE LEFT MASS no tenderness. Urethra and bladder normal.   EXTREMITIES: No clubbing cyanosis or edema.    ASSESSMENT/PLAN:  Routine gynecological examination  -     norgestrel-ethinyl estradioL (LO/OVRAL) 0.3-30 mg-mcg per tablet; Take 1 tablet by mouth once daily.  Dispense: 90 tablet; Refill: 3    UPJ obstruction, congential   Comments:  RIGHT with renal damage    Left ovarian cyst  -     US Pelvis Comp with Transvag NON-OB (xpd; Future; Expected date: 01/02/2024          Patient was counseled today on Pelvic  exams and Pap Smear guidelines.   We discussed STD screening if at high risk for a STD.  We discussed recommendation for breast cancer screening with mammogram every other year after the age of 40 and annually after the age of 50.    We discussed colon cancer screening when indicated.   Osteoporosis screening discussed when indicated.   She was advised to see her primary care physician for all other health maintenance.     FOLLOW-UP with me for next routine visit.

## 2024-01-03 ENCOUNTER — OFFICE VISIT (OUTPATIENT)
Dept: NEPHROLOGY | Facility: CLINIC | Age: 24
End: 2024-01-03
Payer: COMMERCIAL

## 2024-01-03 ENCOUNTER — PATIENT MESSAGE (OUTPATIENT)
Dept: NEPHROLOGY | Facility: CLINIC | Age: 24
End: 2024-01-03

## 2024-01-03 VITALS
DIASTOLIC BLOOD PRESSURE: 70 MMHG | BODY MASS INDEX: 18.99 KG/M2 | WEIGHT: 114 LBS | SYSTOLIC BLOOD PRESSURE: 114 MMHG | HEIGHT: 65 IN | HEART RATE: 114 BPM | OXYGEN SATURATION: 99 %

## 2024-01-03 DIAGNOSIS — E55.9 VITAMIN D DEFICIENCY: ICD-10-CM

## 2024-01-03 DIAGNOSIS — Q62.39 UPJ OBSTRUCTION, CONGENITAL: ICD-10-CM

## 2024-01-03 DIAGNOSIS — I15.1 HYPERTENSION SECONDARY TO OTHER RENAL DISORDERS: ICD-10-CM

## 2024-01-03 DIAGNOSIS — N18.2 CKD (CHRONIC KIDNEY DISEASE) STAGE 2, GFR 60-89 ML/MIN: Primary | ICD-10-CM

## 2024-01-03 DIAGNOSIS — I77.810 DILATED AORTIC ROOT: ICD-10-CM

## 2024-01-03 DIAGNOSIS — N26.1 ATROPHIC KIDNEY: ICD-10-CM

## 2024-01-03 PROCEDURE — 3008F BODY MASS INDEX DOCD: CPT | Mod: CPTII,S$GLB,, | Performed by: INTERNAL MEDICINE

## 2024-01-03 PROCEDURE — 3066F NEPHROPATHY DOC TX: CPT | Mod: CPTII,S$GLB,, | Performed by: INTERNAL MEDICINE

## 2024-01-03 PROCEDURE — 1159F MED LIST DOCD IN RCRD: CPT | Mod: CPTII,S$GLB,, | Performed by: INTERNAL MEDICINE

## 2024-01-03 PROCEDURE — 3078F DIAST BP <80 MM HG: CPT | Mod: CPTII,S$GLB,, | Performed by: INTERNAL MEDICINE

## 2024-01-03 PROCEDURE — 99999 PR PBB SHADOW E&M-EST. PATIENT-LVL III: CPT | Mod: PBBFAC,,, | Performed by: INTERNAL MEDICINE

## 2024-01-03 PROCEDURE — 3074F SYST BP LT 130 MM HG: CPT | Mod: CPTII,S$GLB,, | Performed by: INTERNAL MEDICINE

## 2024-01-03 PROCEDURE — 99215 OFFICE O/P EST HI 40 MIN: CPT | Mod: S$GLB,,, | Performed by: INTERNAL MEDICINE

## 2024-01-03 NOTE — PROGRESS NOTES
Subjective:       Patient ID: Marianne Gasca is a 23 y.o. White female who presents for follow up evaluation of Chronic Kidney Disease    HPI    She is referred by her Care Team at [facility] for history of small unilateral kidney due to UPJ obstruction s/p repair 2016. Last seen by Pediatric Nephrology 9/2022  She presents sooner than her one year follow up due to R flank pain, same pain she had 3 years prior, constant ache, dull not sharp, worst yesterday, non radiating, no LUTS. Uses prescription Toradol after Tylenol   BP has been intermittently elevated, was controlled on low sodium diet and basically DASH diet but required lisinopril, transitioned to ARB,  on low dose losartan now   Healthy diet where BP was controlled prior, 3601-2817  Since childhood good weight   Exercise regimen--light strengthening, pilates, walk and ride bikes   No DM in family   Mom HTN  High Lipids   No LE edema   No Caffeine, in general  Liquid sugar with R kidney 'sensation' thus avoids sugar   Heat intolereance   No chronic GI issues  In grad school, Masters    Jan 2024: Follow up for R atrophic kidney (UPJ obstruction) Continues to consume healthy diet, and exercises. Left ovarian cyst, repeat pelvic u/s this Friday post hormonal treatment. Home BPs 110's/70's     Review of Systems   Constitutional:  Negative for unexpected weight change.   Cardiovascular:  Negative for leg swelling.       Objective:      Physical Exam  Vitals and nursing note reviewed.   Constitutional:       General: She is not in acute distress.     Appearance: Normal appearance. She is normal weight. She is not ill-appearing.   HENT:      Head: Atraumatic.   Eyes:      General: No scleral icterus.  Cardiovascular:      Rate and Rhythm: Normal rate.   Pulmonary:      Effort: Pulmonary effort is normal. No respiratory distress.      Breath sounds: No wheezing or rales.   Abdominal:      General: There is no distension.   Musculoskeletal:          General: No swelling.      Right lower leg: No edema.      Left lower leg: No edema.   Skin:     General: Skin is warm and dry.   Neurological:      Mental Status: She is alert and oriented to person, place, and time. Mental status is at baseline.   Psychiatric:         Mood and Affect: Mood normal.       Assessment:       1. CKD (chronic kidney disease) stage 2, GFR 60-89 ml/min    2. Atrophic kidney    3. Vitamin D deficiency    4. Hypertension secondary to other renal disorders    5. UPJ obstruction, congenital    6. Dilated ascending aorta          Plan:          CKD Stage 1-2 with unilateral small R kidney, currently Stage 2 with eGFR by MDRD 78mL/min   - MAG 3 indicates R kidney with (improved) function, [facility] was as low as 21 per family  - avoid NSAIDs, good BP control, on ARB, low sodium diet, hydration     HTN  - monitor on current dose losartan, birth defects explained     Mineral and Bone Disease  - D level adequate and no evidence of Secondary HPT    Pain control  - avoid NSAIDs    Ovarian mass  - some concern from GYN for mass effect causing hydronephrosis, await pelvic u/s results and will also recheck kidney u/s, good sensitivity for noting hydronephrosis     Dilated ascending aorta  - refer to Cards    Refer to adult PCP      RTC 12months or prn sooner   55 minutes of total time spent on the encounter, which includes face to face time and non-face to face time preparing to see the patient (eg, review of tests), Obtaining and/or reviewing separately obtained history, Documenting clinical information in the electronic or other health record, Independently interpreting results (not separately reported) and communicating results to the patient/family/caregiver, or Care coordination (not separately reported).

## 2024-01-05 ENCOUNTER — HOSPITAL ENCOUNTER (OUTPATIENT)
Dept: RADIOLOGY | Facility: HOSPITAL | Age: 24
Discharge: HOME | End: 2024-01-05
Attending: INTERNAL MEDICINE
Payer: COMMERCIAL

## 2024-01-05 ENCOUNTER — HOSPITAL ENCOUNTER (OUTPATIENT)
Dept: RADIOLOGY | Facility: HOSPITAL | Age: 24
Discharge: HOME | End: 2024-01-05
Attending: OBSTETRICS & GYNECOLOGY
Payer: COMMERCIAL

## 2024-01-05 DIAGNOSIS — N83.202 LEFT OVARIAN CYST: ICD-10-CM

## 2024-01-05 DIAGNOSIS — N18.2 CKD (CHRONIC KIDNEY DISEASE) STAGE 2, GFR 60-89 ML/MIN: ICD-10-CM

## 2024-01-05 DIAGNOSIS — N26.1 ATROPHIC KIDNEY: ICD-10-CM

## 2024-01-05 DIAGNOSIS — I15.1 HYPERTENSION SECONDARY TO OTHER RENAL DISORDERS: ICD-10-CM

## 2024-01-05 DIAGNOSIS — Q62.39 UPJ OBSTRUCTION, CONGENITAL: ICD-10-CM

## 2024-01-05 DIAGNOSIS — E55.9 VITAMIN D DEFICIENCY: ICD-10-CM

## 2024-01-05 PROCEDURE — 76770 US EXAM ABDO BACK WALL COMP: CPT | Mod: TC,PO

## 2024-01-05 PROCEDURE — 76856 US EXAM PELVIC COMPLETE: CPT | Mod: 26,,, | Performed by: RADIOLOGY

## 2024-01-05 PROCEDURE — 76830 TRANSVAGINAL US NON-OB: CPT | Mod: 26,,, | Performed by: RADIOLOGY

## 2024-01-05 PROCEDURE — 76770 US EXAM ABDO BACK WALL COMP: CPT | Mod: 26,,, | Performed by: RADIOLOGY

## 2024-01-05 PROCEDURE — 76830 TRANSVAGINAL US NON-OB: CPT | Mod: TC,PN

## 2024-01-06 ENCOUNTER — PATIENT MESSAGE (OUTPATIENT)
Dept: NEPHROLOGY | Facility: CLINIC | Age: 24
End: 2024-01-06
Payer: COMMERCIAL

## 2024-01-06 ENCOUNTER — PATIENT MESSAGE (OUTPATIENT)
Dept: OBSTETRICS AND GYNECOLOGY | Facility: CLINIC | Age: 24
End: 2024-01-06
Payer: COMMERCIAL

## 2024-01-06 DIAGNOSIS — N13.30 HYDRONEPHROSIS OF LEFT KIDNEY: Primary | ICD-10-CM

## 2024-01-08 ENCOUNTER — TELEPHONE (OUTPATIENT)
Dept: OBSTETRICS AND GYNECOLOGY | Facility: CLINIC | Age: 24
End: 2024-01-08
Payer: COMMERCIAL

## 2024-01-08 ENCOUNTER — PATIENT MESSAGE (OUTPATIENT)
Dept: OBSTETRICS AND GYNECOLOGY | Facility: CLINIC | Age: 24
End: 2024-01-08
Payer: COMMERCIAL

## 2024-01-08 ENCOUNTER — PATIENT MESSAGE (OUTPATIENT)
Dept: NEPHROLOGY | Facility: CLINIC | Age: 24
End: 2024-01-08
Payer: COMMERCIAL

## 2024-01-08 DIAGNOSIS — N83.202 LEFT OVARIAN CYST: Primary | ICD-10-CM

## 2024-01-08 DIAGNOSIS — N13.30 HYDRONEPHROSIS, UNSPECIFIED HYDRONEPHROSIS TYPE: Primary | ICD-10-CM

## 2024-01-08 NOTE — TELEPHONE ENCOUNTER
Forwarded to Dr. Hayward.    Patient is concerned about her right kidney thinning. Please respond.    A urology referral has been scheduled.

## 2024-01-08 NOTE — TELEPHONE ENCOUNTER
----- Message from Benito Hayward MD sent at 1/8/2024 11:04 AM CST -----  Regarding: mutual pt,L  ovarian mass with Left hydronephrosis  Hi Dr. Marlow,    Our mutual pt does indeed have Left hydro as you suspected could occur, based on recent renal u/s. I'm referring her to Urology (she needs one anyway given her Urologic history) but wanted to discuss with you since resection of the ovarian mass seems to have stronger indications.    Thank you,  Benito

## 2024-01-09 ENCOUNTER — PATIENT MESSAGE (OUTPATIENT)
Dept: OBSTETRICS AND GYNECOLOGY | Facility: CLINIC | Age: 24
End: 2024-01-09
Payer: COMMERCIAL

## 2024-01-09 ENCOUNTER — TELEPHONE (OUTPATIENT)
Dept: UROLOGY | Facility: CLINIC | Age: 24
End: 2024-01-09
Payer: COMMERCIAL

## 2024-01-09 DIAGNOSIS — N13.5 UPJ OBSTRUCTION, ACQUIRED: ICD-10-CM

## 2024-01-09 DIAGNOSIS — N13.30 HYDRONEPHROSIS, UNSPECIFIED HYDRONEPHROSIS TYPE: ICD-10-CM

## 2024-01-09 DIAGNOSIS — N83.202 LEFT OVARIAN CYST: Primary | ICD-10-CM

## 2024-01-09 RX ORDER — SODIUM CHLORIDE, SODIUM LACTATE, POTASSIUM CHLORIDE, CALCIUM CHLORIDE 600; 310; 30; 20 MG/100ML; MG/100ML; MG/100ML; MG/100ML
INJECTION, SOLUTION INTRAVENOUS CONTINUOUS
Status: CANCELLED | OUTPATIENT
Start: 2024-01-09

## 2024-01-09 RX ORDER — FAMOTIDINE 20 MG/1
20 TABLET, FILM COATED ORAL
Status: DISCONTINUED | OUTPATIENT
Start: 2024-01-09 | End: 2024-04-01

## 2024-01-09 RX ORDER — CEFAZOLIN SODIUM 2 G/50ML
2 SOLUTION INTRAVENOUS
Status: CANCELLED | OUTPATIENT
Start: 2024-01-09

## 2024-01-09 NOTE — TELEPHONE ENCOUNTER
----- Message from Shannon Marcano LPN sent at 1/8/2024  3:55 PM CST -----  Regarding: FW: Ambulatory referral/consult to Urology    ----- Message -----  From: Juan Torres  Sent: 1/8/2024   3:54 PM CST  To: [facility] Staff  Subject: Ambulatory referral/consult to Urology           Good afternoon,     Patient is requesting a callback ASAP today wanting to know why appointment scheduled on 1/26/24 at 3:00 PM with Cristela Steinberg NP - was canceled regarding  referral/consult to Urology. Please give the patient a callback at [phone].    Cancel Rsn: Patient Canceled (being referred to a different provider.)- Pt states didn't cancel    Thank you,     ERNI Torres

## 2024-01-09 NOTE — TELEPHONE ENCOUNTER
Spoke with patient , she states there was a mix up with getting an appointment she needed , but does not need to schedule an appointment with Urology department here in [place] at this time.

## 2024-01-09 NOTE — TELEPHONE ENCOUNTER
----- Message from Nicola Adelfo sent at 1/9/2024 12:58 PM CST -----  Regarding: Return Call  Patient Returning Call      Who Called: Pt     Who Left Message for Patient: Unknown     Does the patient know what this is regarding?: Yes    Would the patient rather a call back or a response via [facility]?  Call back    Best Call Back Number: [phone]      Additional Information:  Please Advise - Thank you

## 2024-01-10 ENCOUNTER — PATIENT MESSAGE (OUTPATIENT)
Dept: OBSTETRICS AND GYNECOLOGY | Facility: CLINIC | Age: 24
End: 2024-01-10
Payer: COMMERCIAL

## 2024-01-10 NOTE — TELEPHONE ENCOUNTER
Pre-Op Appts scheduled with Dr Marlow and [facility] on 1/17/2024. Patient aware to arrive to pre-op appt with Dr Marlow for 8:40, although scheduled for 10:40. Appt to follow at [facility] at 9:05.Patient verbalized understanding.

## 2024-01-17 ENCOUNTER — OFFICE VISIT (OUTPATIENT)
Dept: OBSTETRICS AND GYNECOLOGY | Facility: CLINIC | Age: 24
End: 2024-01-17
Payer: COMMERCIAL

## 2024-01-17 ENCOUNTER — TELEPHONE (OUTPATIENT)
Dept: NEPHROLOGY | Facility: CLINIC | Age: 24
End: 2024-01-17
Payer: COMMERCIAL

## 2024-01-17 VITALS
BODY MASS INDEX: 19.31 KG/M2 | SYSTOLIC BLOOD PRESSURE: 110 MMHG | DIASTOLIC BLOOD PRESSURE: 62 MMHG | HEIGHT: 64 IN | WEIGHT: 113.13 LBS

## 2024-01-17 DIAGNOSIS — N13.30 HYDRONEPHROSIS, UNSPECIFIED HYDRONEPHROSIS TYPE: ICD-10-CM

## 2024-01-17 DIAGNOSIS — N83.8 OVARIAN MASS, LEFT: Primary | ICD-10-CM

## 2024-01-17 DIAGNOSIS — E86.0 DEHYDRATION: Primary | ICD-10-CM

## 2024-01-17 PROCEDURE — 99999 PR PBB SHADOW E&M-EST. PATIENT-LVL III: CPT | Mod: PBBFAC,,, | Performed by: OBSTETRICS & GYNECOLOGY

## 2024-01-17 PROCEDURE — 99499 UNLISTED E&M SERVICE: CPT | Mod: S$GLB,,, | Performed by: OBSTETRICS & GYNECOLOGY

## 2024-01-17 RX ORDER — SODIUM CHLORIDE 0.9 % (FLUSH) 0.9 %
10 SYRINGE (ML) INJECTION
Status: CANCELLED | OUTPATIENT
Start: 2024-01-17

## 2024-01-17 RX ORDER — HEPARIN 100 UNIT/ML
500 SYRINGE INTRAVENOUS
Status: CANCELLED | OUTPATIENT
Start: 2024-01-17

## 2024-01-17 NOTE — PROGRESS NOTES
Chief Complaint   Patient presents with    Pre-op Exam       History of Present Illness: Marianne Gasca is a 23 y.o. female that presents today 2024 with LMP Patient's last menstrual period was 2024. for   Chief Complaint   Patient presents with    Pre-op Exam         Past Medical History:   Diagnosis Date    Chronic kidney disease, unspecified     HTN (hypertension)     UPJ (ureteropelvic junction) obstruction     RIGHT       Past Surgical History:   Procedure Laterality Date    MOUTH SURGERY      REMOVAL OF URETERAL STENT  10/2016    URETERAL STENT PLACEMENT  2016    ROBOT    wisdom teeth         Outpatient Medications Prior to Visit   Medication Sig Dispense Refill    losartan (COZAAR) 25 MG tablet Take 1 tablet (25 mg total) by mouth every evening. 90 tablet 3    norgestrel-ethinyl estradioL (LO/OVRAL) 0.3-30 mg-mcg per tablet Take 1 tablet by mouth once daily. 90 tablet 3    tretinoin (ALTRENO) 0.05 % Lotn Apply 1 application topically.      losartan (COZAAR) 25 MG tablet Take 1 tablet (25 mg total) by mouth every evening. (Patient not taking: Reported on 2024) 90 tablet 3     Facility-Administered Medications Prior to Visit   Medication Dose Route Frequency Provider Last Rate Last Admin    famotidine tablet 20 mg  20 mg Oral On Call Procedure Diana Marlow MD           Review of patient's allergies indicates:  No Known Allergies    Family History   Problem Relation Age of Onset    Colon cancer Paternal Grandmother     Breast cancer Maternal Grandmother     Ovarian cancer Neg Hx     Uterine cancer Neg Hx     Cervical cancer Neg Hx        Social History     Tobacco Use    Smoking status: Never    Smokeless tobacco: Never   Substance Use Topics    Alcohol use: Yes     Comment: once a month    Drug use: Never       OB History    Para Term  AB Living   0 0 0 0 0 0   SAB IAB Ectopic Multiple Live Births   0 0 0 0 0         /62 (BP Location: Right arm,  "Patient Position: Sitting, BP Method: Medium (Automatic))   Ht 5' 4" (1.626 m)   Wt 51.3 kg (113 lb 1.5 oz)   LMP 01/01/2024   Physical Exam:  APPEARANCE: Well nourished, well developed, in no acute distress.  SKIN: Normal skin turgor, no lesions.  NECK: Neck symmetric without masses   RESPIRATORY: Normal respiratory effort with no retractions or use of accessory muscles  CARDIOVASCULAR: Peripheral vascular system with no swelling no varicosities and palpation of pulses normal  LYMPHATIC: No enlargements of the lymph nodes noted in the neck, axillae, or groin  ABDOMEN: Soft. No tenderness or masses. No hepatosplenomegaly. No hernias.  PELVIC: Normal external female genitalia without lesions. Normal hair distribution. Adequate perineal body, normal urethral meatus. Urethra with no masses.  Bladder nontender. Vagina moist and well rugated without lesions or discharge. Cervix pink and without lesions. No significant cystocele or rectocele. Bimanual exam showed uterus normal size, shape, position, mobile and nontender. Adnexa without masses or tenderness. Urethra and bladder normal.  EXTREMITIES: No clubbing cyanosis or edema.    ASSESSMENT/PLAN:  Ovarian mass, left    Hydronephrosis, unspecified hydronephrosis type      Risks and benefits discussed for laparoscopic cyst possible oophorectomy including bleeding, infection, injury to bowel, bladdder, ureters, blood vessels, and nerves.  Risks of DVT, PE, anesthesia, death.  We discussed alternatives including do nothing or medical management.      We will proceed with Laparoscopic cyst possible oophorectomy coordinated with urology.           "

## 2024-01-18 ENCOUNTER — TELEPHONE (OUTPATIENT)
Dept: NEPHROLOGY | Facility: CLINIC | Age: 24
End: 2024-01-18
Payer: COMMERCIAL

## 2024-01-18 ENCOUNTER — PATIENT MESSAGE (OUTPATIENT)
Dept: NEPHROLOGY | Facility: CLINIC | Age: 24
End: 2024-01-18
Payer: COMMERCIAL

## 2024-01-18 NOTE — TELEPHONE ENCOUNTER
Per Margaret with Dr. Gamble's office, no medical clearance is needed for upcoming Retrograde of kidney.

## 2024-01-18 NOTE — TELEPHONE ENCOUNTER
----- Message from Hilary Freeman sent at 1/18/2024 10:42 AM CST -----  Contact: Vikki/ Dr. Tomás Gamble Office  Vikki is calling in regards to medical clearance for pt surgery on Monday 1/22/24. Please fax to [phone]. Vikki can be reached at [phone].      Thanks  [facility]

## 2024-01-18 NOTE — TELEPHONE ENCOUNTER
Margaret with Dr. Gamble's office was called back and informed Dr. Hayward is working on a medical clearance for patient's upcoming surgery Monday for R Retrogradepyleogram.

## 2024-01-19 ENCOUNTER — HOSPITAL ENCOUNTER (OUTPATIENT)
Dept: RADIOLOGY | Facility: HOSPITAL | Age: 24
Discharge: HOME | End: 2024-01-19
Attending: UROLOGY
Payer: COMMERCIAL

## 2024-01-19 ENCOUNTER — INFUSION (OUTPATIENT)
Dept: INFUSION THERAPY | Facility: HOSPITAL | Age: 24
End: 2024-01-19
Attending: INTERNAL MEDICINE
Payer: COMMERCIAL

## 2024-01-19 VITALS
SYSTOLIC BLOOD PRESSURE: 124 MMHG | RESPIRATION RATE: 16 BRPM | HEART RATE: 99 BPM | WEIGHT: 114.19 LBS | DIASTOLIC BLOOD PRESSURE: 76 MMHG | HEIGHT: 65 IN | BODY MASS INDEX: 19.03 KG/M2

## 2024-01-19 DIAGNOSIS — E86.0 DEHYDRATION: Primary | ICD-10-CM

## 2024-01-19 DIAGNOSIS — N13.30 HYDRONEPHROSIS, UNSPECIFIED HYDRONEPHROSIS TYPE: ICD-10-CM

## 2024-01-19 PROCEDURE — 25500020 PHARM REV CODE 255: Mod: PO | Performed by: UROLOGY

## 2024-01-19 PROCEDURE — 74178 CT ABD&PLV WO CNTR FLWD CNTR: CPT | Mod: TC,PO

## 2024-01-19 PROCEDURE — 74178 CT ABD&PLV WO CNTR FLWD CNTR: CPT | Mod: 26,,, | Performed by: RADIOLOGY

## 2024-01-19 PROCEDURE — 96360 HYDRATION IV INFUSION INIT: CPT | Mod: PN

## 2024-01-19 PROCEDURE — 25000003 PHARM REV CODE 250: Mod: PN | Performed by: INTERNAL MEDICINE

## 2024-01-19 RX ORDER — HEPARIN 100 UNIT/ML
500 SYRINGE INTRAVENOUS
Status: DISCONTINUED | OUTPATIENT
Start: 2024-01-19 | End: 2024-01-19 | Stop reason: HOSPADM

## 2024-01-19 RX ORDER — SODIUM CHLORIDE 0.9 % (FLUSH) 0.9 %
10 SYRINGE (ML) INJECTION
OUTPATIENT
Start: 2024-01-19

## 2024-01-19 RX ORDER — SODIUM CHLORIDE 0.9 % (FLUSH) 0.9 %
10 SYRINGE (ML) INJECTION
Status: DISCONTINUED | OUTPATIENT
Start: 2024-01-19 | End: 2024-01-19 | Stop reason: HOSPADM

## 2024-01-19 RX ORDER — SODIUM CHLORIDE 9 MG/ML
750 INJECTION, SOLUTION INTRAVENOUS
Status: COMPLETED | OUTPATIENT
Start: 2024-01-19 | End: 2024-01-19

## 2024-01-19 RX ORDER — HEPARIN 100 UNIT/ML
500 SYRINGE INTRAVENOUS
OUTPATIENT
Start: 2024-01-19

## 2024-01-19 RX ADMIN — IOHEXOL 125 ML: 350 INJECTION, SOLUTION INTRAVENOUS at 02:01

## 2024-01-19 RX ADMIN — SODIUM CHLORIDE 750 ML: 9 INJECTION, SOLUTION INTRAVENOUS at 12:01

## 2024-01-19 NOTE — PLAN OF CARE
Problem: Adult Inpatient Plan of Care  Goal: Plan of Care Review  Outcome: Ongoing, Progressing  Flowsheets (Taken 1/19/2024 1230)  Plan of Care Reviewed With:   patient   mother  Goal: Patient-Specific Goal (Individualized)  Outcome: Ongoing, Progressing  Flowsheets (Taken 1/19/2024 1230)  Anxieties, Fears or Concerns: Anxiety w IV start  Individualized Care Needs: Recliner, mom at chairside, conversation, tv    Patient to Infusion for IV fluids prior to CT scan w contrast, accompanied by her mom. Treatment plan reviewed with patient. VSS. Tolerated infusion. Prefers using patient portal for upcoming appointment schedule. Escorted to the front lobby in no distress for discharge to home.

## 2024-01-22 PROBLEM — N13.30 HYDRONEPHROSIS: Status: ACTIVE | Noted: 2024-01-22

## 2024-01-22 PROBLEM — N83.202 LEFT OVARIAN CYST: Status: ACTIVE | Noted: 2024-01-22

## 2024-01-23 ENCOUNTER — TELEPHONE (OUTPATIENT)
Dept: OBSTETRICS AND GYNECOLOGY | Facility: CLINIC | Age: 24
End: 2024-01-23
Payer: COMMERCIAL

## 2024-01-23 NOTE — TELEPHONE ENCOUNTER
----- Message from Destiny Corcoran MA sent at 1/23/2024  4:37 PM CST -----  Type:  Patient Returning Call    Who Called:  pt  Who Left Message for Patient:  nurse Daria  Does the patient know what this is regarding?:  yes  Best Call Back Number:  [phone]    Additional Information:  please advise

## 2024-01-23 NOTE — TELEPHONE ENCOUNTER
----- Message from Dang Elena sent at 1/23/2024 10:14 AM CST -----  Type: Needs Medical Advice  Who Called:  Jacki Gasca, mother   Best Call Back Number: [phone]  Additional Information: requesting a call back regarding patients incision

## 2024-01-23 NOTE — TELEPHONE ENCOUNTER
Pre-admit contacted office, states patient's mother informed patient had tape placed over incision area, either showered or bathed and it has now gotten wet. Please advise. Call back number [phone]

## 2024-01-24 ENCOUNTER — TELEPHONE (OUTPATIENT)
Dept: OBSTETRICS AND GYNECOLOGY | Facility: CLINIC | Age: 24
End: 2024-01-24
Payer: COMMERCIAL

## 2024-01-24 NOTE — TELEPHONE ENCOUNTER
----- Message from Mary Kay Pryor sent at 1/24/2024  9:43 AM CST -----  Type:  Reschedule Appointment Request    Caller is requesting to reschedule appointment.      Name of Caller:pt    When is the first available appointment?02/05    Symptoms:post op    Would the patient rather a call back or a response via [facility]? Call back    Best Call Back Number:[phone]      Additional Information: pts appt is scheduled for 02/05 and she is looking to reschedule to 2/7 or 2/8     Please call Back to advise. Thanks!

## 2024-02-07 ENCOUNTER — TELEPHONE (OUTPATIENT)
Dept: NEPHROLOGY | Facility: CLINIC | Age: 24
End: 2024-02-07
Payer: COMMERCIAL

## 2024-02-07 ENCOUNTER — OFFICE VISIT (OUTPATIENT)
Dept: OBSTETRICS AND GYNECOLOGY | Facility: CLINIC | Age: 24
End: 2024-02-07
Payer: COMMERCIAL

## 2024-02-07 VITALS
DIASTOLIC BLOOD PRESSURE: 68 MMHG | BODY MASS INDEX: 18.63 KG/M2 | WEIGHT: 110.25 LBS | SYSTOLIC BLOOD PRESSURE: 118 MMHG

## 2024-02-07 DIAGNOSIS — Q62.39 UPJ OBSTRUCTION, CONGENITAL: Primary | ICD-10-CM

## 2024-02-07 DIAGNOSIS — N13.30 HYDRONEPHROSIS, UNSPECIFIED HYDRONEPHROSIS TYPE: ICD-10-CM

## 2024-02-07 DIAGNOSIS — Z09 POSTOP CHECK: Primary | ICD-10-CM

## 2024-02-07 PROCEDURE — 99024 POSTOP FOLLOW-UP VISIT: CPT | Mod: S$GLB,,, | Performed by: OBSTETRICS & GYNECOLOGY

## 2024-02-07 PROCEDURE — 99999 PR PBB SHADOW E&M-EST. PATIENT-LVL III: CPT | Mod: PBBFAC,,, | Performed by: OBSTETRICS & GYNECOLOGY

## 2024-02-07 PROCEDURE — 3074F SYST BP LT 130 MM HG: CPT | Mod: CPTII,S$GLB,, | Performed by: OBSTETRICS & GYNECOLOGY

## 2024-02-07 PROCEDURE — 3066F NEPHROPATHY DOC TX: CPT | Mod: CPTII,S$GLB,, | Performed by: OBSTETRICS & GYNECOLOGY

## 2024-02-07 PROCEDURE — 3078F DIAST BP <80 MM HG: CPT | Mod: CPTII,S$GLB,, | Performed by: OBSTETRICS & GYNECOLOGY

## 2024-02-07 PROCEDURE — 1159F MED LIST DOCD IN RCRD: CPT | Mod: CPTII,S$GLB,, | Performed by: OBSTETRICS & GYNECOLOGY

## 2024-02-07 NOTE — TELEPHONE ENCOUNTER
Patient called to report being scheduled for a renal ultrasound at the end of March. Also she will have a Mag 3 renal scan at that time from Dr. Tomás Gamble.   After these tests, she will be asking for a referral to an [facility] nephrologist due to financial assistance.

## 2024-02-07 NOTE — TELEPHONE ENCOUNTER
----- Message from Rhina Elias sent at 2/7/2024  1:39 PM CST -----  Contact: DONNELL HENNESSY [[id number]]  ..Type:  Patient Requesting Call    Who Called: DONNELL HENNESSY [[id number]]  Does the patient know what this is regarding?: pt recently had surgery and have questions/concerns.  Would the patient rather a call back or a response via [facility]?  call  Best Call Back Number: .[phone] (home)   Additional Information:

## 2024-02-07 NOTE — PROGRESS NOTES
POST-OP NOTE    2/7/2024    HPI:   Chief Complaint   Patient presents with    Post-op Evaluation       Past Medical History:   Diagnosis Date    Chronic kidney disease, unspecified     HTN (hypertension)     UPJ (ureteropelvic junction) obstruction     RIGHT     Past Surgical History:   Procedure Laterality Date    CYSTOURETEROSCOPY WITH RETROGRADE PYELOGRAPHY AND INSERTION OF STENT INTO URETER Bilateral 1/22/2024    Procedure: CYSTOURETEROSCOPY, WITH RETROGRADE PYELOGRAM AND URETERAL STENT INSERTION;  Surgeon: Tomás Gamble MD;  Location: [facility] OR;  Service: Urology;  Laterality: Bilateral;    LAPAROSCOPIC SURGICAL REMOVAL OF CYST OF OVARY Left 1/22/2024    Procedure: EXCISION, CYST, OVARY, LAPAROSCOPIC;  Surgeon: Diana Marlow MD;  Location: [facility] OR;  Service: OB/GYN;  Laterality: Left;    MOUTH SURGERY      REMOVAL OF URETERAL STENT  10/2016    URETERAL STENT PLACEMENT  09/2016    ROBOT    wisdom teeth       Current Outpatient Medications   Medication Sig Dispense Refill    ibuprofen (ADVIL,MOTRIN) 800 MG tablet Take 1 tablet (800 mg total) by mouth every 8 (eight) hours as needed for Pain. 20 tablet 0    losartan (COZAAR) 25 MG tablet Take 1 tablet (25 mg total) by mouth every evening. 90 tablet 3    norgestrel-ethinyl estradioL (LO/OVRAL) 0.3-30 mg-mcg per tablet Take 1 tablet by mouth once daily. 90 tablet 3    oxyCODONE-acetaminophen (PERCOCET) 5-325 mg per tablet Take 1 tablet by mouth every 4 (four) hours as needed for Pain. 14 tablet 0    tretinoin (ALTRENO) 0.05 % Lotn Apply 1 application topically.       Current Facility-Administered Medications   Medication Dose Route Frequency Provider Last Rate Last Admin    famotidine tablet 20 mg  20 mg Oral On Call Procedure Diana Marlow MD         Review of patient's allergies indicates:  No Known Allergies  Social History     Tobacco Use    Smoking status: Never    Smokeless tobacco: Never   Substance Use Topics    Alcohol use: Yes      Comment: once a month    Drug use: Never     /68   Wt 50 kg (110 lb 3.7 oz)   LMP 01/29/2024 (Exact Date)         PE:   APPEARANCE: Well nourished, well developed, in no acute distress.  SKIN: Normal skin turgor, no lesions.  ABDOMEN: Incision healing fine. Soft. No tenderness or masses. No hepatosplenomegaly. No hernias.  EXTREMITIES: NO clubbing cyanosis or edema    ASSESSMENT  Encounter Diagnoses   Name Primary?    Postop check Yes       PLAN  1. RTC:prn

## 2024-02-21 ENCOUNTER — PATIENT MESSAGE (OUTPATIENT)
Dept: OBSTETRICS AND GYNECOLOGY | Facility: CLINIC | Age: 24
End: 2024-02-21
Payer: COMMERCIAL

## 2024-03-13 RX ORDER — LOSARTAN POTASSIUM 25 MG/1
25 TABLET ORAL NIGHTLY
Qty: 90 TABLET | Refills: 3 | Status: SHIPPED | OUTPATIENT
Start: 2024-03-13

## 2024-03-25 ENCOUNTER — TELEPHONE (OUTPATIENT)
Dept: OBSTETRICS AND GYNECOLOGY | Facility: CLINIC | Age: 24
End: 2024-03-25
Payer: COMMERCIAL

## 2024-03-25 RX ORDER — NORGESTIMATE AND ETHINYL ESTRADIOL 0.25-0.035
1 KIT ORAL DAILY
Qty: 90 TABLET | Refills: 3 | Status: SHIPPED | OUTPATIENT
Start: 2024-03-25 | End: 2025-03-25

## 2024-03-26 ENCOUNTER — PATIENT MESSAGE (OUTPATIENT)
Dept: OBSTETRICS AND GYNECOLOGY | Facility: CLINIC | Age: 24
End: 2024-03-26
Payer: COMMERCIAL

## 2024-03-27 ENCOUNTER — HOSPITAL ENCOUNTER (OUTPATIENT)
Dept: RADIOLOGY | Facility: HOSPITAL | Age: 24
Discharge: HOME | End: 2024-03-27
Attending: UROLOGY
Payer: COMMERCIAL

## 2024-03-27 ENCOUNTER — HOSPITAL ENCOUNTER (OUTPATIENT)
Dept: RADIOLOGY | Facility: HOSPITAL | Age: 24
Discharge: HOME | End: 2024-03-27
Attending: NURSE PRACTITIONER
Payer: COMMERCIAL

## 2024-03-27 DIAGNOSIS — N13.39 OTHER HYDRONEPHROSIS: ICD-10-CM

## 2024-03-27 PROCEDURE — 76770 US EXAM ABDO BACK WALL COMP: CPT | Mod: TC,PO

## 2024-03-27 PROCEDURE — 78708 K FLOW/FUNCT IMAGE W/DRUG: CPT | Mod: TC,PO

## 2024-03-27 PROCEDURE — 76770 US EXAM ABDO BACK WALL COMP: CPT | Mod: 26,,, | Performed by: RADIOLOGY

## 2024-03-27 PROCEDURE — 78708 K FLOW/FUNCT IMAGE W/DRUG: CPT | Mod: 26,,, | Performed by: RADIOLOGY

## 2024-03-27 PROCEDURE — A9562 TC99M MERTIATIDE: HCPCS | Mod: PO | Performed by: NURSE PRACTITIONER

## 2024-03-27 RX ORDER — BETIATIDE 1 MG/1
5.5 INJECTION, POWDER, LYOPHILIZED, FOR SOLUTION INTRAVENOUS
Status: COMPLETED | OUTPATIENT
Start: 2024-03-27 | End: 2024-03-27

## 2024-03-27 RX ORDER — FUROSEMIDE 10 MG/ML
15 INJECTION INTRAMUSCULAR; INTRAVENOUS ONCE
Status: DISCONTINUED | OUTPATIENT
Start: 2024-03-27 | End: 2024-04-04 | Stop reason: HOSPADM

## 2024-03-27 RX ADMIN — TECHNESCAN TC 99M MERTIATIDE 5.5 MILLICURIE: 1 INJECTION, POWDER, LYOPHILIZED, FOR SOLUTION INTRAVENOUS at 12:03

## 2024-03-29 ENCOUNTER — PATIENT MESSAGE (OUTPATIENT)
Dept: NEPHROLOGY | Facility: CLINIC | Age: 24
End: 2024-03-29
Payer: COMMERCIAL

## 2024-03-29 ENCOUNTER — NURSE TRIAGE (OUTPATIENT)
Dept: ADMINISTRATIVE | Facility: CLINIC | Age: 24
End: 2024-03-29
Payer: COMMERCIAL

## 2024-03-29 DIAGNOSIS — Q62.39 UPJ OBSTRUCTION, CONGENITAL: ICD-10-CM

## 2024-03-29 DIAGNOSIS — I15.1 HYPERTENSION SECONDARY TO OTHER RENAL DISORDERS: Primary | ICD-10-CM

## 2024-03-29 NOTE — TELEPHONE ENCOUNTER
Pt had mag 3 scan of kidneys yesterday. Pt is concerned about results with kidney function being mixed up. Pt is going to see new urologist on Monday.     Dispo is to call PCP when office is open. WIll send high priority message to provider's office and radilogist including nephro provider at patient's request. Directed patient to medical records at [facility] [place] for questions about transfer of records to [facility]. Pt v/u.     Reason for Disposition   [1] Caller requesting NON-URGENT health information AND [2] PCP's office is the best resource    Protocols used: Information Only Call - No Triage-A-

## 2024-04-01 ENCOUNTER — OFFICE VISIT (OUTPATIENT)
Dept: UROLOGY | Facility: CLINIC | Age: 24
End: 2024-04-01
Payer: COMMERCIAL

## 2024-04-01 VITALS — BODY MASS INDEX: 18.3 KG/M2 | WEIGHT: 109.81 LBS | HEIGHT: 65 IN

## 2024-04-01 DIAGNOSIS — Q62.39 UPJ OBSTRUCTION, CONGENITAL: ICD-10-CM

## 2024-04-01 LAB
BILIRUBIN, UA POC OHS: NEGATIVE
BLOOD, UA POC OHS: ABNORMAL
CLARITY, UA POC OHS: CLEAR
COLOR, UA POC OHS: YELLOW
GLUCOSE, UA POC OHS: NEGATIVE
KETONES, UA POC OHS: NEGATIVE
LEUKOCYTES, UA POC OHS: ABNORMAL
NITRITE, UA POC OHS: NEGATIVE
PH, UA POC OHS: 6
PROTEIN, UA POC OHS: NEGATIVE
SPECIFIC GRAVITY, UA POC OHS: 1.02
UROBILINOGEN, UA POC OHS: 0.2

## 2024-04-01 PROCEDURE — 1159F MED LIST DOCD IN RCRD: CPT | Mod: CPTII,S$GLB,, | Performed by: UROLOGY

## 2024-04-01 PROCEDURE — 3008F BODY MASS INDEX DOCD: CPT | Mod: CPTII,S$GLB,, | Performed by: UROLOGY

## 2024-04-01 PROCEDURE — 81003 URINALYSIS AUTO W/O SCOPE: CPT | Mod: QW,S$GLB,, | Performed by: UROLOGY

## 2024-04-01 PROCEDURE — 99999 PR PBB SHADOW E&M-EST. PATIENT-LVL III: CPT | Mod: PBBFAC,,, | Performed by: UROLOGY

## 2024-04-01 PROCEDURE — 4010F ACE/ARB THERAPY RXD/TAKEN: CPT | Mod: CPTII,S$GLB,, | Performed by: UROLOGY

## 2024-04-01 PROCEDURE — 3066F NEPHROPATHY DOC TX: CPT | Mod: CPTII,S$GLB,, | Performed by: UROLOGY

## 2024-04-01 PROCEDURE — 99204 OFFICE O/P NEW MOD 45 MIN: CPT | Mod: S$GLB,,, | Performed by: UROLOGY

## 2024-04-01 NOTE — TELEPHONE ENCOUNTER
I would like recent bloodwork for kidney function. My lab orders are placed, thank you     (She is s/p surgical intervention and will defer to Surgeons for definitve treatment of course (Urology and GYN, mass causing hydro)

## 2024-04-01 NOTE — PROGRESS NOTES
Subjective:       Patient ID: Marianne Gasca is a 24 y.o. female.    Chief Complaint: Nephrolithiasis    HPI    24-year-old here to establish care.  She has a history of a chronic left UPJ obstruction.  She underwent pyeloplasty at age 16.  She underwent surgery several months ago for a large right ovarian cyst.  There was mild dilation of the ureter and renal pelvis prior to surgery.  Stent was placed perioperatively to avoid ureteral injury.  Retrograde pyelogram at that time showed normal drainage from the right collecting system.  She had a repeat ultrasound which noted only slight pelviectasis of the right kidney.  The left kidney is atrophic.  Renal scan did note severely diminished function of the left kidney with a differential of 10% left and 90% right.  The scan showed delayed drainage from the right kidney but this does not correspond with her ultrasound images.  Previous renal scan showed T1 half of the right kidney of 6 minutes.  There was no obstruction seen on ultrasound.  GFR is greater than 60.  She is asymptomatic.  Urine dipstick shows negative for nitrites, glucose, protein, positive for trace leukocytes, trace blood    Past Medical History:   Diagnosis Date    Chronic kidney disease, unspecified     HTN (hypertension)     UPJ (ureteropelvic junction) obstruction     RIGHT     Past Surgical History:   Procedure Laterality Date    CYSTOURETEROSCOPY WITH RETROGRADE PYELOGRAPHY AND INSERTION OF STENT INTO URETER Bilateral 1/22/2024    Procedure: CYSTOURETEROSCOPY, WITH RETROGRADE PYELOGRAM AND URETERAL STENT INSERTION;  Surgeon: Tomás Gamble MD;  Location: [facility] OR;  Service: Urology;  Laterality: Bilateral;    LAPAROSCOPIC SURGICAL REMOVAL OF CYST OF OVARY Left 1/22/2024    Procedure: EXCISION, CYST, OVARY, LAPAROSCOPIC;  Surgeon: Diana Marlow MD;  Location: [facility] OR;  Service: OB/GYN;  Laterality: Left;    MOUTH SURGERY      REMOVAL OF URETERAL STENT  10/2016    URETERAL  STENT PLACEMENT  09/2016    ROBOT    wisdom teeth         Current Outpatient Medications:     losartan (COZAAR) 25 MG tablet, Take 1 tablet (25 mg total) by mouth every evening., Disp: 90 tablet, Rfl: 3    norgestimate-ethinyl estradioL (ORTHO-CYCLEN) 0.25-35 mg-mcg per tablet, Take 1 tablet by mouth once daily., Disp: 90 tablet, Rfl: 3    norgestrel-ethinyl estradioL (LO/OVRAL) 0.3-30 mg-mcg per tablet, Take 1 tablet by mouth once daily., Disp: 90 tablet, Rfl: 3  No current facility-administered medications for this visit.    Facility-Administered Medications Ordered in Other Visits:     furosemide injection 15 mg, 15 mg, Intravenous, Once, Maciej Tariq MD      Review of Systems   Constitutional:  Negative for fever.   Genitourinary:  Negative for dysuria and hematuria.       Objective:      Physical Exam  Vitals reviewed.   Constitutional:       Appearance: She is well-developed.   Pulmonary:      Effort: Pulmonary effort is normal. No respiratory distress.   Skin:     General: Skin is warm.   Neurological:      Mental Status: She is alert and oriented to person, place, and time.   Psychiatric:         Behavior: Behavior normal.         Assessment:       1. UPJ obstruction, congenital        Plan:       UPJ obstruction, congenital  -     Ambulatory referral/consult to Urology  -     POCT Urinalysis(Instrument)       I personally reviewed the CT scan, US and renal scan images and made an independent interpretation of the test completed by another healthcare professional.    I recommend annual follow-up with nephrologist for renal function panel and kidney ultrasound.

## 2024-04-03 ENCOUNTER — LAB VISIT (OUTPATIENT)
Dept: LAB | Facility: HOSPITAL | Age: 24
End: 2024-04-03
Attending: INTERNAL MEDICINE
Payer: COMMERCIAL

## 2024-04-03 DIAGNOSIS — Q62.39 UPJ OBSTRUCTION, CONGENITAL: ICD-10-CM

## 2024-04-03 DIAGNOSIS — I15.1 HYPERTENSION SECONDARY TO OTHER RENAL DISORDERS: ICD-10-CM

## 2024-04-03 LAB
CREAT UR-MCNC: 252 MG/DL (ref 15–325)
PROT UR-MCNC: 8 MG/DL (ref 0–15)
PROT/CREAT UR: 0.03 MG/G{CREAT} (ref 0–0.2)

## 2024-04-03 PROCEDURE — 82570 ASSAY OF URINE CREATININE: CPT | Performed by: INTERNAL MEDICINE

## 2024-04-11 ENCOUNTER — HOSPITAL ENCOUNTER (OUTPATIENT)
Dept: RADIOLOGY | Facility: HOSPITAL | Age: 24
Discharge: HOME | End: 2024-04-11
Attending: NURSE PRACTITIONER
Payer: COMMERCIAL

## 2024-04-11 DIAGNOSIS — N13.39 OTHER HYDRONEPHROSIS: ICD-10-CM

## 2024-04-11 PROCEDURE — 78708 K FLOW/FUNCT IMAGE W/DRUG: CPT | Mod: TC,PO

## 2024-04-11 PROCEDURE — A9562 TC99M MERTIATIDE: HCPCS | Mod: PO | Performed by: NURSE PRACTITIONER

## 2024-04-11 PROCEDURE — 78708 K FLOW/FUNCT IMAGE W/DRUG: CPT | Mod: 26,,, | Performed by: RADIOLOGY

## 2024-04-11 RX ORDER — BETIATIDE 1 MG/1
5.5 INJECTION, POWDER, LYOPHILIZED, FOR SOLUTION INTRAVENOUS
Status: COMPLETED | OUTPATIENT
Start: 2024-04-11 | End: 2024-04-11

## 2024-04-11 RX ADMIN — TECHNESCAN TC 99M MERTIATIDE 5.5 MILLICURIE: 1 INJECTION, POWDER, LYOPHILIZED, FOR SOLUTION INTRAVENOUS at 01:04

## 2024-09-11 ENCOUNTER — PATIENT MESSAGE (OUTPATIENT)
Dept: NEPHROLOGY | Facility: CLINIC | Age: 24
End: 2024-09-11
Payer: COMMERCIAL

## 2024-09-17 ENCOUNTER — TELEPHONE (OUTPATIENT)
Dept: NEPHROLOGY | Facility: CLINIC | Age: 24
End: 2024-09-17
Payer: COMMERCIAL

## 2024-09-17 DIAGNOSIS — Q62.39 UPJ OBSTRUCTION, CONGENITAL: Primary | ICD-10-CM

## 2024-09-17 DIAGNOSIS — I15.1 HYPERTENSION SECONDARY TO OTHER RENAL DISORDERS: ICD-10-CM

## 2024-09-17 NOTE — TELEPHONE ENCOUNTER
Assessment:       1. CKD (chronic kidney disease) stage 2, GFR 60-89 ml/min    2. Atrophic kidney    3. Vitamin D deficiency    4. Hypertension secondary to other renal disorders    5. UPJ obstruction, congenital              Plan:          CKD Stage 1-2 with unilateral small R kidney, currently Stage 2 with eGFR by MDRD 78mL/min   - avoid NSAIDs, good BP control, on ARB, low sodium diet of 2,000mg or less per day, and ensure hydration--water is best choice     HTN, hypertension  - monitor on current dose losartan  - low sodium diet as above  - exercise  - maintain healthy weight     Mineral and Bone Disease  - D level adequate and no evidence of Secondary HPT    Pain control  - avoid NSAIDs

## 2024-12-30 RX ORDER — NORGESTIMATE AND ETHINYL ESTRADIOL 0.25-0.035
1 KIT ORAL
Qty: 84 TABLET | Refills: 0 | Status: SHIPPED | OUTPATIENT
Start: 2024-12-30

## 2024-12-30 NOTE — TELEPHONE ENCOUNTER
Refill Decision Note   Marianne Campos  is requesting a refill authorization.  Brief Assessment and Rationale for Refill:  Approve     Medication Therapy Plan:       Medication Reconciliation Completed: No   Comments:     No Care Gaps recommended.     Note composed:11:48 AM 12/30/2024

## 2025-01-08 ENCOUNTER — PATIENT MESSAGE (OUTPATIENT)
Dept: NEPHROLOGY | Facility: CLINIC | Age: 25
End: 2025-01-08
Payer: COMMERCIAL

## 2025-01-08 ENCOUNTER — OFFICE VISIT (OUTPATIENT)
Dept: OBSTETRICS AND GYNECOLOGY | Facility: CLINIC | Age: 25
End: 2025-01-08
Payer: COMMERCIAL

## 2025-01-08 ENCOUNTER — OFFICE VISIT (OUTPATIENT)
Dept: NEPHROLOGY | Facility: CLINIC | Age: 25
End: 2025-01-08
Payer: COMMERCIAL

## 2025-01-08 VITALS
WEIGHT: 115.31 LBS | BODY MASS INDEX: 19.21 KG/M2 | HEIGHT: 65 IN | DIASTOLIC BLOOD PRESSURE: 70 MMHG | SYSTOLIC BLOOD PRESSURE: 114 MMHG

## 2025-01-08 VITALS
OXYGEN SATURATION: 99 % | SYSTOLIC BLOOD PRESSURE: 114 MMHG | BODY MASS INDEX: 19.16 KG/M2 | HEIGHT: 65 IN | DIASTOLIC BLOOD PRESSURE: 70 MMHG | WEIGHT: 115 LBS

## 2025-01-08 DIAGNOSIS — I77.810 DILATED AORTIC ROOT: ICD-10-CM

## 2025-01-08 DIAGNOSIS — N26.1 ATROPHIC KIDNEY: Primary | ICD-10-CM

## 2025-01-08 DIAGNOSIS — Z12.4 ENCOUNTER FOR SCREENING FOR CERVICAL CANCER: Primary | ICD-10-CM

## 2025-01-08 DIAGNOSIS — N19 RENAL FAILURE, UNSPECIFIED CHRONICITY: ICD-10-CM

## 2025-01-08 DIAGNOSIS — D50.8 OTHER IRON DEFICIENCY ANEMIA: ICD-10-CM

## 2025-01-08 DIAGNOSIS — Q62.39 UPJ OBSTRUCTION, CONGENITAL: ICD-10-CM

## 2025-01-08 DIAGNOSIS — I15.1 HYPERTENSION SECONDARY TO OTHER RENAL DISORDERS: ICD-10-CM

## 2025-01-08 PROCEDURE — 3078F DIAST BP <80 MM HG: CPT | Mod: CPTII,S$GLB,, | Performed by: OBSTETRICS & GYNECOLOGY

## 2025-01-08 PROCEDURE — 4010F ACE/ARB THERAPY RXD/TAKEN: CPT | Mod: CPTII,S$GLB,, | Performed by: INTERNAL MEDICINE

## 2025-01-08 PROCEDURE — 3074F SYST BP LT 130 MM HG: CPT | Mod: CPTII,S$GLB,, | Performed by: OBSTETRICS & GYNECOLOGY

## 2025-01-08 PROCEDURE — 99215 OFFICE O/P EST HI 40 MIN: CPT | Mod: S$GLB,,, | Performed by: INTERNAL MEDICINE

## 2025-01-08 PROCEDURE — 3008F BODY MASS INDEX DOCD: CPT | Mod: CPTII,S$GLB,, | Performed by: INTERNAL MEDICINE

## 2025-01-08 PROCEDURE — 99999 PR PBB SHADOW E&M-EST. PATIENT-LVL III: CPT | Mod: PBBFAC,,, | Performed by: INTERNAL MEDICINE

## 2025-01-08 PROCEDURE — 3008F BODY MASS INDEX DOCD: CPT | Mod: CPTII,S$GLB,, | Performed by: OBSTETRICS & GYNECOLOGY

## 2025-01-08 PROCEDURE — 3066F NEPHROPATHY DOC TX: CPT | Mod: CPTII,S$GLB,, | Performed by: INTERNAL MEDICINE

## 2025-01-08 PROCEDURE — 1159F MED LIST DOCD IN RCRD: CPT | Mod: CPTII,S$GLB,, | Performed by: INTERNAL MEDICINE

## 2025-01-08 PROCEDURE — 99395 PREV VISIT EST AGE 18-39: CPT | Mod: S$GLB,,, | Performed by: OBSTETRICS & GYNECOLOGY

## 2025-01-08 PROCEDURE — 99999 PR PBB SHADOW E&M-EST. PATIENT-LVL III: CPT | Mod: PBBFAC,,, | Performed by: OBSTETRICS & GYNECOLOGY

## 2025-01-08 PROCEDURE — 3074F SYST BP LT 130 MM HG: CPT | Mod: CPTII,S$GLB,, | Performed by: INTERNAL MEDICINE

## 2025-01-08 PROCEDURE — 3078F DIAST BP <80 MM HG: CPT | Mod: CPTII,S$GLB,, | Performed by: INTERNAL MEDICINE

## 2025-01-08 PROCEDURE — 1159F MED LIST DOCD IN RCRD: CPT | Mod: CPTII,S$GLB,, | Performed by: OBSTETRICS & GYNECOLOGY

## 2025-01-08 RX ORDER — NORGESTIMATE AND ETHINYL ESTRADIOL 0.25-0.035
1 KIT ORAL DAILY
Qty: 84 TABLET | Refills: 3 | Status: SHIPPED | OUTPATIENT
Start: 2025-01-08

## 2025-01-08 RX ORDER — LOSARTAN POTASSIUM 25 MG/1
25 TABLET ORAL NIGHTLY
Qty: 90 TABLET | Refills: 3 | Status: SHIPPED | OUTPATIENT
Start: 2025-01-08

## 2025-01-08 NOTE — PROGRESS NOTES
Chief Complaint   Patient presents with    Well Woman       History of Present Illness: Marianne Gasca is a 24 y.o. female that presents today 1/8/2025 with Patient's last menstrual period was 12/23/2024.  for well gyn visit.    Past Medical History:   Diagnosis Date    Chronic kidney disease, unspecified     HTN (hypertension)     UPJ (ureteropelvic junction) obstruction     RIGHT       Past Surgical History:   Procedure Laterality Date    CYSTOURETEROSCOPY WITH RETROGRADE PYELOGRAPHY AND INSERTION OF STENT INTO URETER Bilateral 1/22/2024    Procedure: CYSTOURETEROSCOPY, WITH RETROGRADE PYELOGRAM AND URETERAL STENT INSERTION;  Surgeon: Tomás Gamble MD;  Location: [facility] OR;  Service: Urology;  Laterality: Bilateral;    LAPAROSCOPIC SURGICAL REMOVAL OF CYST OF OVARY Left 1/22/2024    Procedure: EXCISION, CYST, OVARY, LAPAROSCOPIC;  Surgeon: Diana Marlow MD;  Location: [facility] OR;  Service: OB/GYN;  Laterality: Left;    MOUTH SURGERY      REMOVAL OF URETERAL STENT  10/2016    URETERAL STENT PLACEMENT  09/2016    ROBOT    wisdom teeth         Outpatient Medications Prior to Visit   Medication Sig Dispense Refill    losartan (COZAAR) 25 MG tablet Take 1 tablet (25 mg total) by mouth every evening. 90 tablet 3    MONO-LINYAH 0.25-35 mg-mcg per tablet TAKE 1 TABLET BY MOUTH ONCE  DAILY 84 tablet 0    norgestrel-ethinyl estradioL (LO/OVRAL) 0.3-30 mg-mcg per tablet Take 1 tablet by mouth once daily. 90 tablet 3     No facility-administered medications prior to visit.       Review of patient's allergies indicates:  No Known Allergies    Family History   Problem Relation Name Age of Onset    Colon cancer Paternal Grandmother      Breast cancer Maternal Grandmother      Ovarian cancer Neg Hx      Uterine cancer Neg Hx      Cervical cancer Neg Hx         Social History     Socioeconomic History    Marital status: Single   Tobacco Use    Smoking status: Never    Smokeless tobacco: Never   Substance and  "Sexual Activity    Alcohol use: Yes     Comment: once a month    Drug use: Never    Sexual activity: Never       OB History    Para Term  AB Living   0 0 0 0 0 0   SAB IAB Ectopic Multiple Live Births   0 0 0 0 0       Review of Symptoms:  GENERAL: Denies weight gain or weight loss. Feeling well overall.   SKIN: Denies rash or lesions.   HEAD: Denies head injury or headache.   NODES: Denies enlarged lymph nodes.   CHEST: Denies chest pain or shortness of breath.   CARDIOVASCULAR: Denies palpitations or left sided chest pain.   ABDOMEN: No abdominal pain, constipation, diarrhea, nausea, vomiting or rectal bleeding.   URINARY: No frequency, dysuria, hematuria, or burning on urination.  HEMATOLOGIC: No easy bruisability or excessive bleeding.   MUSCULOSKELETAL: Denies joint pain or swelling.     /70   Ht 5' 4.5" (1.638 m)   Wt 52.3 kg (115 lb 4.8 oz)   LMP 2024   Physical Exam:  APPEARANCE: Well nourished, well developed, in no acute distress.  SKIN: Normal skin turgor, no lesions.  NECK: Neck symmetric without masses   RESPIRATORY: Normal respiratory effort with no retractions or use of accessory muscles  CARDIOVASCULAR: Peripheral vascular system with no swelling no varicosities and palpation of pulses normal  LYMPHATIC: No enlargements of the lymph nodes noted in the neck, axillae, or groin  ABDOMEN: Soft. No tenderness or masses. No hepatosplenomegaly. No hernias.  BREASTS: Symmetrical, no skin changes or visible lesions. No palpable masses, nipple discharge or adenopathy bilaterally.  PELVIC: Normal external female genitalia without lesions. Normal hair distribution. Adequate perineal body, normal urethral meatus. Urethra with no masses.  Bladder nontender. Vagina moist and well rugated without lesions or discharge. Cervix pink and without lesions. No significant cystocele or rectocele. Bimanual exam showed uterus normal size, shape, position, mobile and nontender. Adnexa without " masses or tenderness. Urethra and bladder normal.   EXTREMITIES: No clubbing cyanosis or edema.    ASSESSMENT/PLAN:  Encounter for screening for cervical cancer  -     Liquid-Based Pap Smear, Screening    Other orders  -     norgestimate-ethinyl estradioL (MONO-LINYAH) 0.25-35 mg-mcg per tablet; Take 1 tablet by mouth once daily.  Dispense: 84 tablet; Refill: 3          Patient was counseled today on Pelvic exams and Pap Smear guidelines.   We discussed STD screening if at high risk for a STD.  We discussed recommendation for breast cancer screening with mammogram every other year after the age of 40 and annually after the age of 50.    We discussed colon cancer screening when indicated.   Osteoporosis screening discussed when indicated.   She was advised to see her primary care physician for all other health maintenance.     FOLLOW-UP with me for next routine visit.

## 2025-01-08 NOTE — PROGRESS NOTES
"Subjective:       Patient ID: Marianne Gasca is a 24 y.o. White female who presents for follow up evaluation of Chronic Kidney Disease    HPI    She is referred by her Care Team at [facility] for history of small unilateral kidney due to UPJ obstruction s/p repair 2016. Last seen by Pediatric Nephrology 9/2022  She presents sooner than her one year follow up due to R flank pain, same pain she had 3 years prior, constant ache, dull not sharp, worst yesterday, non radiating, no LUTS. Uses prescription Toradol after Tylenol   BP has been intermittently elevated, was controlled on low sodium diet and basically DASH diet but required lisinopril, transitioned to ARB,  on low dose losartan now   Healthy diet where BP was controlled prior, [id number]  Since childhood good weight   Exercise regimen--light strengthening, pilates, walk and ride bikes   No DM in family   Mom HTN  High Lipids   No LE edema   No Caffeine, in general  Liquid sugar with R kidney 'sensation' thus avoids sugar   Heat intolereance   No chronic GI issues  In grad school, Masters    Jan 2024: Follow up for R atrophic kidney (UPJ obstruction) Continues to consume healthy diet, and exercises. Left ovarian cyst, repeat pelvic u/s this Friday post hormonal treatment. Home BPs 110's/70's     Jan 2025: s/p resection of LEFT ovarian mass which was causing hydronephrosis, path was nonmalignant cyst. Her RIGHT kidney has history of UPJ obstruction s/p repair at 15yo. NM showed delay in left kidney function (?) post operatively.   BP at home stable. Consuming healthy balanced diet. Started taking otc iron. In grad school, graduating in May.    Review of Systems   Constitutional:  Negative for unexpected weight change.   Cardiovascular:  Negative for leg swelling.   Gastrointestinal:  Negative for abdominal pain, constipation and diarrhea.       /70   Ht 5' 4.5" (1.638 m)   Wt 52.2 kg (115 lb)   LMP 12/23/2024   SpO2 99%   BMI 19.43 " kg/m²     Objective:      Physical Exam  Vitals and nursing note reviewed.   Constitutional:       General: She is not in acute distress.     Appearance: Normal appearance. She is normal weight. She is not ill-appearing.   HENT:      Head: Atraumatic.   Eyes:      General: No scleral icterus.  Cardiovascular:      Rate and Rhythm: Normal rate.   Pulmonary:      Effort: Pulmonary effort is normal. No respiratory distress.      Breath sounds: No wheezing or rales.   Abdominal:      General: There is no distension.   Musculoskeletal:         General: No swelling.      Right lower leg: No edema.      Left lower leg: No edema.   Skin:     General: Skin is warm and dry.   Neurological:      Mental Status: She is alert and oriented to person, place, and time. Mental status is at baseline.   Psychiatric:         Mood and Affect: Mood normal.       Assessment:    1. Atrophic kidney (Primary)    2. UPJ obstruction, congenital    3. Dilated ascending aorta    4. Hypertension secondary to other renal disorders        Plan:        CKD Stage 1-2 with unilateral small R kidney, currently Stage 2 with eGFR by MDRD 78mL/min   - MAG 3 indicates R kidney with (improved) function, [facility] was as low as 21 per family  - avoid NSAIDs, good BP control, on ARB, low sodium diet, hydration   UPDATE:  4/2024 NM renogram: Based on percentage uptake, the left kidney contributes 74.57% to total renal function and the right kidney contributes 25.43% to total renal function.   Jan 2025  - check renal u/s and repeat NM renal scan  - avoid NSAIDs, good BP control, on ARB, low sodium diet, hydration     HTN  - stable, monitor on current dose losartan, birth defects discussed     Mineral and Bone Disease  - D level adequate and no evidence of Secondary HPT    Pain control  - avoid NSAIDs    Ovarian cyst resection 1/22/2024  - follows with GYN    Dilated ascending aorta  - Cards appt is scheduled       Labs and scans then RTC to review   RTC  12months or prn sooner   45 minutes of total time spent on the encounter, which includes face to face time and non-face to face time preparing to see the patient (eg, review of tests), Obtaining and/or reviewing separately obtained history, Documenting clinical information in the electronic or other health record, Independently interpreting results (not separately reported) and communicating results to the patient/family/caregiver, or Care coordination (not separately reported).

## 2025-01-14 ENCOUNTER — HOSPITAL ENCOUNTER (OUTPATIENT)
Dept: RADIOLOGY | Facility: HOSPITAL | Age: 25
Discharge: HOME | End: 2025-01-14
Attending: INTERNAL MEDICINE
Payer: COMMERCIAL

## 2025-01-14 DIAGNOSIS — N19 RENAL FAILURE, UNSPECIFIED CHRONICITY: ICD-10-CM

## 2025-01-14 DIAGNOSIS — N26.1 ATROPHIC KIDNEY: ICD-10-CM

## 2025-01-14 DIAGNOSIS — Q62.39 UPJ OBSTRUCTION, CONGENITAL: ICD-10-CM

## 2025-01-14 DIAGNOSIS — D50.8 OTHER IRON DEFICIENCY ANEMIA: ICD-10-CM

## 2025-01-14 PROCEDURE — 76770 US EXAM ABDO BACK WALL COMP: CPT | Mod: 26,,, | Performed by: RADIOLOGY

## 2025-01-14 PROCEDURE — 76770 US EXAM ABDO BACK WALL COMP: CPT | Mod: TC,PO

## 2025-01-17 ENCOUNTER — HOSPITAL ENCOUNTER (OUTPATIENT)
Dept: RADIOLOGY | Facility: HOSPITAL | Age: 25
Discharge: HOME | End: 2025-01-17
Attending: INTERNAL MEDICINE
Payer: COMMERCIAL

## 2025-01-17 DIAGNOSIS — N19 RENAL FAILURE, UNSPECIFIED CHRONICITY: ICD-10-CM

## 2025-01-17 PROCEDURE — 78708 K FLOW/FUNCT IMAGE W/DRUG: CPT | Mod: TC,PO

## 2025-01-17 PROCEDURE — A9562 TC99M MERTIATIDE: HCPCS | Mod: PO | Performed by: INTERNAL MEDICINE

## 2025-01-17 PROCEDURE — 78708 K FLOW/FUNCT IMAGE W/DRUG: CPT | Mod: 26,,, | Performed by: STUDENT IN AN ORGANIZED HEALTH CARE EDUCATION/TRAINING PROGRAM

## 2025-01-17 RX ORDER — BETIATIDE 1 MG/1
5.31 INJECTION, POWDER, LYOPHILIZED, FOR SOLUTION INTRAVENOUS
Status: COMPLETED | OUTPATIENT
Start: 2025-01-17 | End: 2025-01-17

## 2025-01-17 RX ADMIN — TECHNESCAN TC 99M MERTIATIDE 5.31 MILLICURIE: 1 INJECTION, POWDER, LYOPHILIZED, FOR SOLUTION INTRAVENOUS at 01:01

## 2025-01-21 ENCOUNTER — PATIENT MESSAGE (OUTPATIENT)
Dept: NEPHROLOGY | Facility: CLINIC | Age: 25
End: 2025-01-21
Payer: COMMERCIAL

## 2025-02-21 ENCOUNTER — PATIENT MESSAGE (OUTPATIENT)
Dept: OBSTETRICS AND GYNECOLOGY | Facility: CLINIC | Age: 25
End: 2025-02-21
Payer: COMMERCIAL

## 2025-02-28 ENCOUNTER — PATIENT MESSAGE (OUTPATIENT)
Dept: OBSTETRICS AND GYNECOLOGY | Facility: CLINIC | Age: 25
End: 2025-02-28
Payer: COMMERCIAL

## 2025-04-20 NOTE — PROGRESS NOTES
Subjective:    Patient ID:  Marianne Gasca is a 25 y.o. female who presents for evaluation of dilated ascending aorta      Problem List Items Addressed This Visit          Cardiac/Vascular    Dilated ascending aorta - Primary    Relevant Orders    IN OFFICE EKG 12-LEAD (to [facility])    Echo    CT Chest Without Contrast    Hypertension secondary to other renal disorders    Relevant Orders    IN OFFICE EKG 12-LEAD (to [facility])    Echo    CT Chest Without Contrast         History of Present Illness    CHIEF COMPLAINT:  Ms. Gasca presents today for follow up of enlarged aorta    CARDIOVASCULAR:  She has history of slightly enlarged aorta monitored with echos every 2 years, with last study performed in 2023. She denies chest pain, dyspnea, cardiac stents, or angiograms.    RENAL:  She has CKD secondary to UPJ obstruction that was surgically corrected at age 16. The condition was initially discovered due to persistent BP elevations. Right kidney remains scarred and smaller, currently functioning at 30% capacity compared to the left kidney.    FAMILY HISTORY:  Her brother was diagnosed with bicuspid aortic valve as an infant. Her great grandmother had a stroke. Her paternal grandfather currently has cardiac problems with fluid accumulation around the heart.    MEDICATIONS:  She takes Losartan 25 mg nightly for BP control and kidney protection.    SURGICAL HISTORY:  She underwent ovarian cyst removal in January 2024.    DIET:  She maintains a low sodium diet for BP management.    Parts of this note were transcribed using voice recognition and generative artificial intelligence software ([employer]).  Please excuse any grammatical or syntax errors and reach out to me with any questions or clarifications needed.         Dilated ascending aorta history - Found at [facility] every 2 years    Family history of heart disease - Brother with Bicuspid aortic valve, grandfather with fluid around his  heart      Past Medical History:   Diagnosis Date    Chronic kidney disease, unspecified     HTN (hypertension)     UPJ (ureteropelvic junction) obstruction     RIGHT       Past Surgical History:   Procedure Laterality Date    CYSTOURETEROSCOPY WITH RETROGRADE PYELOGRAPHY AND INSERTION OF STENT INTO URETER Bilateral 1/22/2024    Procedure: CYSTOURETEROSCOPY, WITH RETROGRADE PYELOGRAM AND URETERAL STENT INSERTION;  Surgeon: Tomás Gamble MD;  Location: [facility] OR;  Service: Urology;  Laterality: Bilateral;    LAPAROSCOPIC SURGICAL REMOVAL OF CYST OF OVARY Left 1/22/2024    Procedure: EXCISION, CYST, OVARY, LAPAROSCOPIC;  Surgeon: Diana Marlow MD;  Location: [facility] OR;  Service: OB/GYN;  Laterality: Left;    MOUTH SURGERY      REMOVAL OF URETERAL STENT  10/2016    URETERAL STENT PLACEMENT  09/2016    ROBOT    wisdom teeth         Family History   Problem Relation Name Age of Onset    Colon cancer Paternal Grandmother      Breast cancer Maternal Grandmother      Ovarian cancer Neg Hx      Uterine cancer Neg Hx      Cervical cancer Neg Hx         Social History     Socioeconomic History    Marital status: Single   Tobacco Use    Smoking status: Never    Smokeless tobacco: Never   Substance and Sexual Activity    Alcohol use: Yes     Comment: once a month    Drug use: Never    Sexual activity: Never     Social Drivers of Health     Financial Resource Strain: Patient Declined (4/15/2025)    Overall Financial Resource Strain (CARDIA)     Difficulty of Paying Living Expenses: Patient declined   Food Insecurity: Patient Declined (4/15/2025)    Hunger Vital Sign     Worried About Running Out of Food in the Last Year: Patient declined     Ran Out of Food in the Last Year: Patient declined   Transportation Needs: Patient Declined (4/15/2025)    PRAPARE - Transportation     Lack of Transportation (Medical): Patient declined     Lack of Transportation (Non-Medical): Patient declined   Physical Activity: Insufficiently  "Active (4/15/2025)    Exercise Vital Sign     Days of Exercise per Week: 1 day     Minutes of Exercise per Session: 60 min   Stress: Stress Concern Present (4/15/2025)    [employer] [place] [employer] - Occupational Stress Questionnaire     Feeling of Stress : To some extent   Housing Stability: Patient Declined (4/15/2025)    Housing Stability Vital Sign     Unable to Pay for Housing in the Last Year: Patient declined     Number of Times Moved in the Last Year: 0     Homeless in the Last Year: Patient declined       Review of patient's allergies indicates:  No Known Allergies    Review of Systems   Constitutional: Negative for decreased appetite, fever and malaise/fatigue.   Eyes:  Negative for blurred vision.   Cardiovascular:  Negative for chest pain, dyspnea on exertion, irregular heartbeat and leg swelling.   Respiratory:  Negative for cough, hemoptysis, shortness of breath and wheezing.    Endocrine: Negative for cold intolerance and heat intolerance.   Hematologic/Lymphatic: Negative for bleeding problem.   Musculoskeletal:  Negative for muscle weakness and myalgias.   Gastrointestinal:  Negative for abdominal pain, constipation and diarrhea.   Genitourinary:  Negative for bladder incontinence.   Neurological:  Negative for dizziness and weakness.   Psychiatric/Behavioral:  Negative for depression.         Objective:     Vitals:    04/21/25 0836   BP: 122/82   BP Location: Left arm   Patient Position: Sitting   Pulse: 103   Weight: 52.1 kg (114 lb 13.8 oz)   Height: 5' 4.5" (1.638 m)       BP Readings from Last 5 Encounters:   04/21/25 122/82   01/08/25 114/70   01/08/25 114/70   02/07/24 118/68   01/22/24 100/67        Physical Exam  Vitals and nursing note reviewed.   Constitutional:       General: She is not in acute distress.     Appearance: She is well-developed.   HENT:      Head: Normocephalic and atraumatic.   Neck:      Vascular: No JVD.   Cardiovascular:      Rate and Rhythm: Normal rate " "and regular rhythm.      Heart sounds: Normal heart sounds. No murmur heard.     No friction rub. No gallop.   Pulmonary:      Effort: Pulmonary effort is normal. No respiratory distress.      Breath sounds: Normal breath sounds. No wheezing or rales.   Abdominal:      General: Bowel sounds are normal.      Palpations: Abdomen is soft.      Tenderness: There is no abdominal tenderness. There is no guarding or rebound.   Musculoskeletal:         General: No tenderness.      Cervical back: Neck supple.   Skin:     General: Skin is warm and dry.   Neurological:      Mental Status: She is alert and oriented to person, place, and time.   Psychiatric:         Behavior: Behavior normal.             Current Outpatient Medications   Medication Instructions    losartan (COZAAR) 25 mg, Oral, Nightly    norgestrel-ethinyl estradioL (LO/OVRAL) 0.3-30 mg-mcg per tablet 1 tablet, Oral, Daily       Lipid Panel:   No results found for: "CHOL", "HDL", "LDLCALC", "TRIG", "CHOLHDL"      The ASCVD Risk score ([place] DK, et al., 2019) failed to calculate for the following reasons:    The 2019 ASCVD risk score is only valid for ages 40 to 79    Most Recent EKG Results  No results found for this or any previous visit.    Most Recent Echocardiogram Results  No results found for this or any previous visit.      Most Recent Nuclear Stress Test Results  No results found for this or any previous visit.      Most Recent Cardiac PET Stress Test Results  No results found for this or any previous visit.      Most Recent Cardiovascular Angiogram results  No results found for this or any previous visit.      Other Most Recent Cardiology Results  Results for orders placed during the hospital encounter of 01/22/24    CARDIAC MONITORING STRIPS        All pertinent data including labs, imaging, EKGs, and studies listed above were reviewed.  Patient's most recent EKG tracing was personally interpreted by this provider.    Diagnoses:       1. Dilated " ascending aorta    2. Hypertension secondary to other renal disorders         Plan for treatment of the above diagnoses:     Assessment & Plan    BP well controlled with current Losartan regimen.    PLAN SUMMARY:   Continue Losartan 25 mg nightly   Echocardiogram   Order non-contrast CT chest to assess aortic size    PLAN NOTE:   Continued Losartan 25 mg at night.   Echocardiogram   Ordered non-contrast CT chest to assess aortic size and minimize risk to compromised renal function.        Echocardiogram    CT non contrast considering history of renal disease    Continue Losartan 25 mg PO Daily    Continue other cardiac medications  Mediterranean Diet/Cardiovascular Exercise Program    Visit today included increased complexity associated with the care of the episodic problem(s) addressed above in addition to managing the longitudinal care of the patient due to the serious and/or complex managed problem(s) listed above.    Parts of this note were transcribed using voice recognition and generative artificial intelligence software ([employer]).  Please excuse any grammatical or syntax errors and reach out to me with any questions or clarifications needed.    Patient queried and all questions were answered.    F/u in 1 year to reassess      Signed:    Eduardo Antoine MD  4/21/2025 9:10 AM

## 2025-04-21 ENCOUNTER — OFFICE VISIT (OUTPATIENT)
Dept: CARDIOLOGY | Facility: CLINIC | Age: 25
End: 2025-04-21
Payer: COMMERCIAL

## 2025-04-21 VITALS
HEIGHT: 65 IN | DIASTOLIC BLOOD PRESSURE: 82 MMHG | BODY MASS INDEX: 19.14 KG/M2 | WEIGHT: 114.88 LBS | HEART RATE: 103 BPM | SYSTOLIC BLOOD PRESSURE: 122 MMHG

## 2025-04-21 DIAGNOSIS — I77.810 DILATED AORTIC ROOT: Primary | ICD-10-CM

## 2025-04-21 DIAGNOSIS — I15.1 HYPERTENSION SECONDARY TO OTHER RENAL DISORDERS: ICD-10-CM

## 2025-04-21 PROCEDURE — 99999 PR PBB SHADOW E&M-EST. PATIENT-LVL III: CPT | Mod: PBBFAC,,, | Performed by: INTERNAL MEDICINE

## 2025-04-21 PROCEDURE — G2211 COMPLEX E/M VISIT ADD ON: HCPCS | Mod: S$GLB,,, | Performed by: INTERNAL MEDICINE

## 2025-04-21 PROCEDURE — 3008F BODY MASS INDEX DOCD: CPT | Mod: CPTII,S$GLB,, | Performed by: INTERNAL MEDICINE

## 2025-04-21 PROCEDURE — 3066F NEPHROPATHY DOC TX: CPT | Mod: CPTII,S$GLB,, | Performed by: INTERNAL MEDICINE

## 2025-04-21 PROCEDURE — 1159F MED LIST DOCD IN RCRD: CPT | Mod: CPTII,S$GLB,, | Performed by: INTERNAL MEDICINE

## 2025-04-21 PROCEDURE — 93005 ELECTROCARDIOGRAM TRACING: CPT | Mod: PO

## 2025-04-21 PROCEDURE — 99204 OFFICE O/P NEW MOD 45 MIN: CPT | Mod: S$GLB,,, | Performed by: INTERNAL MEDICINE

## 2025-04-21 PROCEDURE — 1160F RVW MEDS BY RX/DR IN RCRD: CPT | Mod: CPTII,S$GLB,, | Performed by: INTERNAL MEDICINE

## 2025-04-21 PROCEDURE — 3079F DIAST BP 80-89 MM HG: CPT | Mod: CPTII,S$GLB,, | Performed by: INTERNAL MEDICINE

## 2025-04-21 PROCEDURE — 3074F SYST BP LT 130 MM HG: CPT | Mod: CPTII,S$GLB,, | Performed by: INTERNAL MEDICINE

## 2025-04-21 PROCEDURE — 4010F ACE/ARB THERAPY RXD/TAKEN: CPT | Mod: CPTII,S$GLB,, | Performed by: INTERNAL MEDICINE

## 2025-04-22 ENCOUNTER — OFFICE VISIT (OUTPATIENT)
Dept: NEPHROLOGY | Facility: CLINIC | Age: 25
End: 2025-04-22
Payer: COMMERCIAL

## 2025-04-22 DIAGNOSIS — E55.9 VITAMIN D DEFICIENCY: ICD-10-CM

## 2025-04-22 DIAGNOSIS — N18.2 CKD (CHRONIC KIDNEY DISEASE) STAGE 2, GFR 60-89 ML/MIN: ICD-10-CM

## 2025-04-22 DIAGNOSIS — Q62.39 UPJ OBSTRUCTION, CONGENITAL: Primary | ICD-10-CM

## 2025-04-22 PROCEDURE — 1159F MED LIST DOCD IN RCRD: CPT | Mod: CPTII,95,, | Performed by: INTERNAL MEDICINE

## 2025-04-22 PROCEDURE — 4010F ACE/ARB THERAPY RXD/TAKEN: CPT | Mod: CPTII,95,, | Performed by: INTERNAL MEDICINE

## 2025-04-22 PROCEDURE — 3066F NEPHROPATHY DOC TX: CPT | Mod: CPTII,95,, | Performed by: INTERNAL MEDICINE

## 2025-04-22 PROCEDURE — 98007 SYNCH AUDIO-VIDEO EST HI 40: CPT | Mod: 95,,, | Performed by: INTERNAL MEDICINE

## 2025-04-22 NOTE — PROGRESS NOTES
Subjective:       Patient ID: Marianne Gasca is a 25 y.o. White female who presents for follow up evaluation of Chronic Kidney Disease    HPI    She is referred by her Care Team at [facility] for history of small unilateral kidney due to UPJ obstruction s/p repair 2016. Last seen by Pediatric Nephrology 9/2022  She presents sooner than her one year follow up due to R flank pain, same pain she had 3 years prior, constant ache, dull not sharp, worst yesterday, non radiating, no LUTS. Uses prescription Toradol after Tylenol   BP has been intermittently elevated, was controlled on low sodium diet and basically DASH diet but required lisinopril, transitioned to ARB,  on low dose losartan now   Healthy diet where BP was controlled prior, 3097-9714  Since childhood good weight   Exercise regimen--light strengthening, pilates, walk and ride bikes   No DM in family   Mom HTN  High Lipids   No LE edema   No Caffeine, in general  Liquid sugar with R kidney 'sensation' thus avoids sugar   Heat intolereance   No chronic GI issues  In grad school, Masters    Jan 2024: Follow up for R atrophic kidney (UPJ obstruction) Continues to consume healthy diet, and exercises. Left ovarian cyst, repeat pelvic u/s this Friday post hormonal treatment. Home BPs 110's/70's     Jan 2025: s/p resection of LEFT ovarian mass which was causing hydronephrosis, path was nonmalignant cyst. Her RIGHT kidney has history of UPJ obstruction s/p repair at 15yo. NM showed delay in left kidney function (?) post operatively.   BP at home stable. Consuming healthy balanced diet. Started taking otc iron. In grad school, graduating in May.    April 2025:  The patient location is: [place]  The chief complaint leading to consultation is: CKD  Visit type: audiovisual  50 minutes of total time spent on the encounter, which includes face to face time and non-face to face time preparing to see the patient (eg, review of tests), Obtaining and/or reviewing  separately obtained history, Documenting clinical information in the electronic or other health record, Independently interpreting results (not separately reported) and communicating results to the patient/family/caregiver, or Care coordination (not separately reported).   Each patient to whom he or she provides medical services by telemedicine is:  (1) informed of the relationship between the physician and patient and the respective role of any other health care provider with respect to management of the patient; and (2) notified that he or she may decline to receive medical services by telemedicine and may withdraw from such care at any time.  Notes: follow up       Review of Systems   Constitutional:  Negative for unexpected weight change.   Cardiovascular:  Negative for leg swelling.   Gastrointestinal:  Negative for abdominal pain, constipation and diarrhea.           Objective:      Physical Exam  Vitals and nursing note reviewed.   Constitutional:       General: She is not in acute distress.     Appearance: Normal appearance. She is normal weight. She is not ill-appearing.   HENT:      Head: Atraumatic.   Eyes:      General: No scleral icterus.  Cardiovascular:      Rate and Rhythm: Normal rate.   Pulmonary:      Effort: Pulmonary effort is normal. No respiratory distress.      Breath sounds: No wheezing or rales.   Abdominal:      General: There is no distension.   Musculoskeletal:         General: No swelling.      Right lower leg: No edema.      Left lower leg: No edema.   Skin:     General: Skin is warm and dry.   Neurological:      Mental Status: She is alert and oriented to person, place, and time. Mental status is at baseline.   Psychiatric:         Mood and Affect: Mood normal.       Assessment:    1. Atrophic kidney (Primary)    2. UPJ obstruction, congenital    3. Dilated ascending aorta    4. Hypertension secondary to other renal disorders        Plan:        CKD Stage 1-2 with unilateral small R  kidney, currently Stage 2 with eGFR by MDRD 78mL/min   - MAG 3 indicates R kidney with (improved) function, [facility] was as low as 21 per family  - avoid NSAIDs, good BP control, on ARB, low sodium diet, hydration   UPDATE:  4/2024 NM renogram: Based on percentage uptake, the left kidney contributes 74.57% to total renal function and the right kidney contributes 25.43% to total renal function.   Jan 2025  - check renal u/s and repeat NM renal scan  - avoid NSAIDs, good BP control, on ARB, low sodium diet, hydration   April 2025  - stable kidney function     HTN  - stable, monitor on current dose losartan    Mineral and Bone Disease  - D level adequate and no evidence of Secondary HPT    Pain control  - avoid NSAIDs    Ovarian cyst resection 1/22/2024  - follows with GYN    Dilated ascending aorta  - saw Cards yesterday        RTC 12months or prn sooner

## 2025-04-25 ENCOUNTER — HOSPITAL ENCOUNTER (OUTPATIENT)
Dept: CARDIOLOGY | Facility: HOSPITAL | Age: 25
Discharge: HOME | End: 2025-04-25
Attending: INTERNAL MEDICINE
Payer: COMMERCIAL

## 2025-04-25 ENCOUNTER — HOSPITAL ENCOUNTER (OUTPATIENT)
Dept: RADIOLOGY | Facility: HOSPITAL | Age: 25
Discharge: HOME | End: 2025-04-25
Attending: INTERNAL MEDICINE
Payer: COMMERCIAL

## 2025-04-25 ENCOUNTER — RESULTS FOLLOW-UP (OUTPATIENT)
Dept: CARDIOLOGY | Facility: CLINIC | Age: 25
End: 2025-04-25

## 2025-04-25 VITALS — WEIGHT: 114 LBS | HEART RATE: 80 BPM | HEIGHT: 64 IN | BODY MASS INDEX: 19.46 KG/M2

## 2025-04-25 DIAGNOSIS — I77.810 DILATED AORTIC ROOT: ICD-10-CM

## 2025-04-25 DIAGNOSIS — I15.1 HYPERTENSION SECONDARY TO OTHER RENAL DISORDERS: ICD-10-CM

## 2025-04-25 LAB
ASCENDING AORTA: 2.3 CM
AV INDEX (PROSTH): 0.76
AV MEAN GRADIENT: 2 MMHG
AV PEAK GRADIENT: 6 MMHG
AV VALVE AREA BY VELOCITY RATIO: 2.6 CM²
AV VALVE AREA: 2.4 CM²
AV VELOCITY RATIO: 0.83
BSA FOR ECHO PROCEDURE: 1.53 M2
CV ECHO LV RWT: 0.46 CM
DOP CALC AO PEAK VEL: 1.2 M/S
DOP CALC AO VTI: 21.7 CM
DOP CALC LVOT AREA: 3.1 CM2
DOP CALC LVOT DIAMETER: 2 CM
DOP CALC LVOT PEAK VEL: 1 M/S
DOP CALC LVOT STROKE VOLUME: 51.5 CM3
DOP CALCLVOT PEAK VEL VTI: 16.4 CM
E WAVE DECELERATION TIME: 232 MSEC
E/A RATIO: 1.83
E/E' RATIO: 6 M/S
ECHO LV POSTERIOR WALL: 0.9 CM (ref 0.6–1.1)
EJECTION FRACTION: 65 %
FRACTIONAL SHORTENING: 33.3 % (ref 28–44)
INTERVENTRICULAR SEPTUM: 0.7 CM (ref 0.6–1.1)
IVRT: 106 MSEC
LEFT ATRIUM AREA SYSTOLIC (APICAL 2 CHAMBER): 11.23 CM2
LEFT ATRIUM AREA SYSTOLIC (APICAL 4 CHAMBER): 11.34 CM2
LEFT ATRIUM SIZE: 2.5 CM
LEFT ATRIUM VOLUME INDEX MOD: 18 ML/M2
LEFT ATRIUM VOLUME MOD: 28 ML
LEFT INTERNAL DIMENSION IN SYSTOLE: 2.6 CM (ref 2.1–4)
LEFT VENTRICLE DIASTOLIC VOLUME INDEX: 41.56 ML/M2
LEFT VENTRICLE DIASTOLIC VOLUME: 64 ML
LEFT VENTRICLE END SYSTOLIC VOLUME APICAL 2 CHAMBER: 22.78 ML
LEFT VENTRICLE END SYSTOLIC VOLUME APICAL 4 CHAMBER: 28.51 ML
LEFT VENTRICLE MASS INDEX: 58.2 G/M2
LEFT VENTRICLE SYSTOLIC VOLUME INDEX: 16.2 ML/M2
LEFT VENTRICLE SYSTOLIC VOLUME: 25 ML
LEFT VENTRICULAR INTERNAL DIMENSION IN DIASTOLE: 3.9 CM (ref 3.5–6)
LEFT VENTRICULAR MASS: 89.7 G
LV LATERAL E/E' RATIO: 5.7 M/S
LV SEPTAL E/E' RATIO: 6.6 M/S
LVED V (TEICH): 64 ML
LVES V (TEICH): 24.54 ML
LVOT MG: 2.05 MMHG
LVOT MV: 0.64 CM/S
MV PEAK A VEL: 0.47 M/S
MV PEAK E VEL: 0.86 M/S
MV STENOSIS PRESSURE HALF TIME: 67.17 MS
MV VALVE AREA P 1/2 METHOD: 3.28 CM2
OHS CV RV/LV RATIO: 0.82 CM
OHS QRS DURATION: 86 MS
OHS QTC CALCULATION: 407 MS
PISA TR MAX VEL: 2 M/S
PULM VEIN S/D RATIO: 0.72
PV PEAK D VEL: 0.74 M/S
PV PEAK S VEL: 0.53 M/S
RA PRESSURE ESTIMATED: 3 MMHG
RA VOL SYS: 20.99 ML
RIGHT ATRIAL AREA: 10.2 CM2
RIGHT ATRIUM VOLUME AREA LENGTH APICAL 4 CHAMBER: 20.21 ML
RIGHT VENTRICLE DIASTOLIC BASEL DIMENSION: 3.2 CM
RIGHT VENTRICLE DIASTOLIC LENGTH: 5.1 CM
RIGHT VENTRICLE DIASTOLIC MID DIMENSION: 2.7 CM
RIGHT VENTRICULAR END-DIASTOLIC DIMENSION: 3.19 CM
RIGHT VENTRICULAR LENGTH IN DIASTOLE (APICAL 4-CHAMBER VIEW): 5.08 CM
RV MID DIAMA: 2.69 CM
RV TB RVSP: 5 MMHG
RV TISSUE DOPPLER FREE WALL SYSTOLIC VELOCITY 1 (APICAL 4 CHAMBER VIEW): 11.03 CM/S
SINUS: 2.56 CM
STJ: 2.1 CM
TDI LATERAL: 0.15 M/S
TDI SEPTAL: 0.13 M/S
TDI: 0.14 M/S
TR MAX PG: 15 MMHG
TRICUSPID ANNULAR PLANE SYSTOLIC EXCURSION: 2.4 CM
TV REST PULMONARY ARTERY PRESSURE: 19 MMHG
Z-SCORE OF LEFT VENTRICULAR DIMENSION IN END DIASTOLE: -1.38
Z-SCORE OF LEFT VENTRICULAR DIMENSION IN END SYSTOLE: -0.52

## 2025-04-25 PROCEDURE — 93306 TTE W/DOPPLER COMPLETE: CPT | Mod: 26,,, | Performed by: INTERNAL MEDICINE

## 2025-04-25 PROCEDURE — 71250 CT THORAX DX C-: CPT | Mod: 26,,, | Performed by: RADIOLOGY

## 2025-04-25 PROCEDURE — 71250 CT THORAX DX C-: CPT | Mod: TC,PO

## 2025-04-25 PROCEDURE — 93306 TTE W/DOPPLER COMPLETE: CPT | Mod: PO

## 2025-04-28 ENCOUNTER — PATIENT MESSAGE (OUTPATIENT)
Dept: CARDIOLOGY | Facility: CLINIC | Age: 25
End: 2025-04-28
Payer: COMMERCIAL

## 2025-05-22 ENCOUNTER — PATIENT MESSAGE (OUTPATIENT)
Dept: OBSTETRICS AND GYNECOLOGY | Facility: CLINIC | Age: 25
End: 2025-05-22
Payer: COMMERCIAL

## 2025-07-14 ENCOUNTER — PATIENT MESSAGE (OUTPATIENT)
Dept: NEPHROLOGY | Facility: CLINIC | Age: 25
End: 2025-07-14
Payer: COMMERCIAL

## 2025-08-25 ENCOUNTER — PATIENT MESSAGE (OUTPATIENT)
Dept: NEPHROLOGY | Facility: CLINIC | Age: 25
End: 2025-08-25
Payer: COMMERCIAL